# Patient Record
Sex: MALE | Race: ASIAN | NOT HISPANIC OR LATINO | URBAN - METROPOLITAN AREA
[De-identification: names, ages, dates, MRNs, and addresses within clinical notes are randomized per-mention and may not be internally consistent; named-entity substitution may affect disease eponyms.]

---

## 2017-08-02 ENCOUNTER — OFFICE VISIT (OUTPATIENT)
Dept: FAMILY MEDICINE | Facility: CLINIC | Age: 22
End: 2017-08-02

## 2017-08-02 VITALS
BODY MASS INDEX: 21.61 KG/M2 | SYSTOLIC BLOOD PRESSURE: 119 MMHG | WEIGHT: 126.6 LBS | HEIGHT: 64 IN | TEMPERATURE: 98.8 F | HEART RATE: 77 BPM | DIASTOLIC BLOOD PRESSURE: 76 MMHG

## 2017-08-02 DIAGNOSIS — Z23 NEED FOR VACCINATION: Primary | ICD-10-CM

## 2017-08-02 DIAGNOSIS — K92.1 BLOOD IN STOOL: ICD-10-CM

## 2017-08-02 LAB
% GRANULOCYTES: 65 %G (ref 40–75)
ALBUMIN SERPL BCP-MCNC: 4.5 G/DL (ref 3.5–5)
ALP SERPL-CCNC: 70 U/L (ref 45–120)
ALT SERPL W/O P-5'-P-CCNC: 18 U/L (ref 0–45)
ANION GAP SERPL CALCULATED.3IONS-SCNC: 10 MMOL/L (ref 5–18)
AST SERPL-CCNC: 23 U/L (ref 0–40)
BILIRUB SERPL-MCNC: 0.7 MG/DL (ref 0–1)
BUN SERPL-MCNC: 10 MG/DL (ref 8–22)
CALCIUM SERPL-MCNC: 10.1 MG/DL (ref 8.5–10.5)
CHLORIDE SERPL-SCNC: 101 MMOL/L (ref 98–107)
CO2 SERPL-SCNC: 27 MMOL/L (ref 22–31)
CREAT SERPL-MCNC: 0.86 MG/DL (ref 0.7–1.3)
GLUCOSE SERPL-MCNC: 78 MG/DL (ref 70–125)
GRANULOCYTES #: 4.1 K/UL (ref 1.6–8.3)
HCT VFR BLD AUTO: 53.3 % (ref 40–53)
HEMOGLOBIN: 16.3 G/DL (ref 13.3–17.7)
LYMPHOCYTES # BLD AUTO: 1.7 K/UL (ref 0.8–5.3)
LYMPHOCYTES NFR BLD AUTO: 27.4 %L (ref 20–48)
MCH RBC QN AUTO: 32 PG (ref 26.5–35)
MCHC RBC AUTO-ENTMCNC: 30.6 G/DL (ref 32–36)
MCV RBC AUTO: 104.5 FL (ref 78–100)
MID #: 0.5 K/UL (ref 0–2.2)
MID %: 7.6 %M (ref 0–20)
PLATELET # BLD AUTO: 330 K/UL (ref 150–450)
POTASSIUM SERPL-SCNC: 4.5 MMOL/L (ref 3.5–5)
PROT SERPL-MCNC: 8.1 G/DL (ref 6–8)
RBC # BLD AUTO: 5.1 M/UL (ref 4.4–5.9)
SODIUM SERPL-SCNC: 138 MMOL/L (ref 136–145)
WBC # BLD AUTO: 6.3 K/UL (ref 4–11)

## 2017-08-02 NOTE — PROGRESS NOTES
"[facility] Visit    Subjective:  Andrey Mccray is a 22 year old male with a PMHx significant for an unspecified liver disease who presents with bright red blood per rectum.     Starting 2 weeks ago, he began noticing small amounts of bright red blood in his stool and on toilet paper after wiping. He reports it comes and goes each day, but it always remains the same small amount. He denies any rectal or abdominal pain. His stools have been normal and soft. Denies constipation or hard stools. No diarrhea, nausea, vomiting, abdominal distension. No fever, chills, rash. No recent travel or sick contacts. He cannot recall any significant preceding event 2 weeks ago - no new or suspicious foods, no illness.     Of note, he describes having a significant liver disease in 2014 around the time he immigrated from [facility] [facility]. He recalls being jaundiced and ill, and getting imaging of his liver. He doesn't remember what disease he had or the treatment he received, but was told that he was completely cured. Hasn't had any issues since.     Immunizations are not UTD, planning to get Hep A, HPV #3 and Hep B #3 today.     ROS:   General: No fevers, chills, weight loss  Head: No headache  Neck: No swallowing problems   Resp: No cough, wheezing, congestion, coryza  GI: No constipation, diarrhea, nausea or vomiting  Skin: No rash    Objective:  Vitals:    08/02/17 1028   BP: 119/76   BP Location: Left arm   Patient Position: Sitting   Cuff Size: Adult Regular   Pulse: 77   Temp: 98.8  F (37.1  C)   TempSrc: Oral   Weight: 126 lb 9.6 oz (57.4 kg)   Height: 5' 3.5\" (161.3 cm)     Body mass index is 22.07 kg/(m^2).    Gen: NAD, appears well-hydrated  HEENT: PERRLA, no scleral icterus; no mouth lesions or ulcers, nasopharynx pink and moist, oropharynx pink and moist  Neck: supple without LAD, no masses  CV: RRR, no murmurs/rubs/gallops  Pulm: CTAB, no wheezes/rales/rhonchi, good air entry   ABD: soft, nontender, " nondistended, no hepatosplenomegaly, no masses, no rebound, +BS throughout  Rectal: no internal or external hemorrhoids, no anal fissures, no abnormalities noted on anoscopy; no actively bleeding sites or lesions; normal prostate on FRANTZ  Skin: no jaundice, no rash or lesions    Assessment/Plan:  Andrey was seen today for intermittent, minor rectal bleeding. He has no red flag signs or symptoms, nor any significant GI symptoms. No pain or other complaints. Normal physical exam and vitals stable. DDx includes hemorrhoids and anal fissure given BRBPR in an otherwise healthy young person, but Dr. Gamble and I could not visualize any abnormalities. Other ddx include lower GI bleed, UC/Crohn's, liver disease and malignancy, though less likely given his presentation. We will get a CBC and CMP to investigate any concerning labs, but I expect his minor bleeding to resolve spontaneously. Patient knows to follow up with clinic if symptoms worsen or persist in the next week.     Diagnoses and all orders for this visit:    Need for vaccination  -     ADMIN VACCINE, INITIAL  -     ADMIN VACCINE, EACH ADDITIONAL  -     HEPATITIS A VACCINE ADULT IM  -     HEPATITIS B VACCINE,ADULT,IM  -     HPV9 (Gardasil 9 )    Blood in stool  -     CBC with Diff Plt ([facility])  -     Comprehensive Metabolic Panel ([place])      Options for treatment and follow-up care were reviewed with the patient who was engaged and actively involved in the decision making process, verbalized understanding of the options discussed, and satisfied with the final plan.    Patient was staffed with supervising physician, Dr. Gamble.     Quique Linda MD, PGY-1  [facility]

## 2017-08-02 NOTE — PROGRESS NOTES
Preceptor attestation:  Patient seen and discussed with the resident. Assessment and plan reviewed with resident and agreed upon.  Supervising physician: Alok Gamble  [facility]

## 2017-08-02 NOTE — PATIENT INSTRUCTIONS
Continue to monitor closely if bleeding worsens or persists, call clinic immediately if concerned.     We will get blood tests today and call you with results. We may set up a future appointment depending on the results.

## 2017-08-03 NOTE — PROGRESS NOTES
Please call patient with normal lab results. He speaks Yanni. His blood tests returned normal, and he will not have to follow-up unless his symptoms worsen or persist in the next week. Thanks.

## 2017-11-29 ENCOUNTER — OFFICE VISIT - HEALTHEAST (OUTPATIENT)
Dept: ADDICTION MEDICINE | Facility: HOSPITAL | Age: 22
End: 2017-11-29

## 2017-11-29 DIAGNOSIS — F10.10 MILD ALCOHOL USE DISORDER: ICD-10-CM

## 2018-10-02 ENCOUNTER — OFFICE VISIT - HEALTHEAST (OUTPATIENT)
Dept: ADDICTION MEDICINE | Facility: CLINIC | Age: 23
End: 2018-10-02

## 2018-10-02 DIAGNOSIS — F12.10 CANNABIS USE DISORDER, MILD, ABUSE: ICD-10-CM

## 2018-10-02 DIAGNOSIS — F10.20 MODERATE ALCOHOL USE DISORDER (H): ICD-10-CM

## 2018-10-04 ENCOUNTER — AMBULATORY - HEALTHEAST (OUTPATIENT)
Dept: ADDICTION MEDICINE | Facility: CLINIC | Age: 23
End: 2018-10-04

## 2018-10-11 ENCOUNTER — OFFICE VISIT - HEALTHEAST (OUTPATIENT)
Dept: ADDICTION MEDICINE | Facility: HOSPITAL | Age: 23
End: 2018-10-11

## 2018-10-11 DIAGNOSIS — F10.20 MODERATE ALCOHOL USE DISORDER (H): ICD-10-CM

## 2018-10-22 ENCOUNTER — OFFICE VISIT - HEALTHEAST (OUTPATIENT)
Dept: ADDICTION MEDICINE | Facility: HOSPITAL | Age: 23
End: 2018-10-22

## 2018-10-22 DIAGNOSIS — F10.20 MODERATE ALCOHOL USE DISORDER (H): ICD-10-CM

## 2018-10-23 ENCOUNTER — OFFICE VISIT - HEALTHEAST (OUTPATIENT)
Dept: ADDICTION MEDICINE | Facility: HOSPITAL | Age: 23
End: 2018-10-23

## 2018-10-23 DIAGNOSIS — F10.20 ALCOHOL USE DISORDER, SEVERE, DEPENDENCE (H): ICD-10-CM

## 2018-10-25 ENCOUNTER — AMBULATORY - HEALTHEAST (OUTPATIENT)
Dept: ADDICTION MEDICINE | Facility: HOSPITAL | Age: 23
End: 2018-10-25

## 2018-10-25 ENCOUNTER — OFFICE VISIT - HEALTHEAST (OUTPATIENT)
Dept: ADDICTION MEDICINE | Facility: HOSPITAL | Age: 23
End: 2018-10-25

## 2018-10-25 DIAGNOSIS — F10.20 ALCOHOL USE DISORDER, SEVERE, DEPENDENCE (H): ICD-10-CM

## 2018-10-26 ENCOUNTER — AMBULATORY - HEALTHEAST (OUTPATIENT)
Dept: ADDICTION MEDICINE | Facility: HOSPITAL | Age: 23
End: 2018-10-26

## 2018-11-08 ENCOUNTER — OFFICE VISIT - HEALTHEAST (OUTPATIENT)
Dept: ADDICTION MEDICINE | Facility: HOSPITAL | Age: 23
End: 2018-11-08

## 2018-11-08 DIAGNOSIS — F10.20 MODERATE ALCOHOL USE DISORDER (H): ICD-10-CM

## 2018-11-09 ENCOUNTER — AMBULATORY - HEALTHEAST (OUTPATIENT)
Dept: ADDICTION MEDICINE | Facility: HOSPITAL | Age: 23
End: 2018-11-09

## 2018-11-13 ENCOUNTER — OFFICE VISIT - HEALTHEAST (OUTPATIENT)
Dept: ADDICTION MEDICINE | Facility: HOSPITAL | Age: 23
End: 2018-11-13

## 2018-11-13 DIAGNOSIS — F10.20 MODERATE ALCOHOL USE DISORDER (H): ICD-10-CM

## 2018-11-15 ENCOUNTER — OFFICE VISIT - HEALTHEAST (OUTPATIENT)
Dept: ADDICTION MEDICINE | Facility: HOSPITAL | Age: 23
End: 2018-11-15

## 2018-11-15 DIAGNOSIS — F10.20 MODERATE ALCOHOL USE DISORDER (H): ICD-10-CM

## 2018-11-19 ENCOUNTER — AMBULATORY - HEALTHEAST (OUTPATIENT)
Dept: ADDICTION MEDICINE | Facility: HOSPITAL | Age: 23
End: 2018-11-19

## 2018-11-20 ENCOUNTER — OFFICE VISIT - HEALTHEAST (OUTPATIENT)
Dept: ADDICTION MEDICINE | Facility: HOSPITAL | Age: 23
End: 2018-11-20

## 2018-11-20 DIAGNOSIS — F10.20 MODERATE ALCOHOL USE DISORDER (H): ICD-10-CM

## 2018-11-23 ENCOUNTER — AMBULATORY - HEALTHEAST (OUTPATIENT)
Dept: ADDICTION MEDICINE | Facility: HOSPITAL | Age: 23
End: 2018-11-23

## 2018-11-27 ENCOUNTER — OFFICE VISIT - HEALTHEAST (OUTPATIENT)
Dept: ADDICTION MEDICINE | Facility: HOSPITAL | Age: 23
End: 2018-11-27

## 2018-11-27 DIAGNOSIS — F10.20 MODERATE ALCOHOL USE DISORDER (H): ICD-10-CM

## 2018-11-29 ENCOUNTER — AMBULATORY - HEALTHEAST (OUTPATIENT)
Dept: ADDICTION MEDICINE | Facility: HOSPITAL | Age: 23
End: 2018-11-29

## 2018-11-29 ENCOUNTER — OFFICE VISIT - HEALTHEAST (OUTPATIENT)
Dept: ADDICTION MEDICINE | Facility: HOSPITAL | Age: 23
End: 2018-11-29

## 2018-11-29 DIAGNOSIS — F10.20 ALCOHOL USE DISORDER, MODERATE, DEPENDENCE (H): ICD-10-CM

## 2018-12-04 ENCOUNTER — OFFICE VISIT - HEALTHEAST (OUTPATIENT)
Dept: ADDICTION MEDICINE | Facility: HOSPITAL | Age: 23
End: 2018-12-04

## 2018-12-04 DIAGNOSIS — F10.20 ALCOHOL USE DISORDER, MODERATE, DEPENDENCE (H): ICD-10-CM

## 2018-12-06 ENCOUNTER — OFFICE VISIT - HEALTHEAST (OUTPATIENT)
Dept: ADDICTION MEDICINE | Facility: HOSPITAL | Age: 23
End: 2018-12-06

## 2018-12-06 DIAGNOSIS — F10.20 ALCOHOL USE DISORDER, MODERATE, DEPENDENCE (H): ICD-10-CM

## 2018-12-07 ENCOUNTER — AMBULATORY - HEALTHEAST (OUTPATIENT)
Dept: ADDICTION MEDICINE | Facility: HOSPITAL | Age: 23
End: 2018-12-07

## 2018-12-11 ENCOUNTER — OFFICE VISIT - HEALTHEAST (OUTPATIENT)
Dept: ADDICTION MEDICINE | Facility: HOSPITAL | Age: 23
End: 2018-12-11

## 2018-12-11 DIAGNOSIS — F10.20 ALCOHOL USE DISORDER, MODERATE, DEPENDENCE (H): ICD-10-CM

## 2018-12-13 ENCOUNTER — OFFICE VISIT - HEALTHEAST (OUTPATIENT)
Dept: ADDICTION MEDICINE | Facility: HOSPITAL | Age: 23
End: 2018-12-13

## 2018-12-13 DIAGNOSIS — F10.20 ALCOHOL USE DISORDER, MODERATE, DEPENDENCE (H): ICD-10-CM

## 2018-12-14 ENCOUNTER — AMBULATORY - HEALTHEAST (OUTPATIENT)
Dept: ADDICTION MEDICINE | Facility: HOSPITAL | Age: 23
End: 2018-12-14

## 2018-12-18 ENCOUNTER — OFFICE VISIT - HEALTHEAST (OUTPATIENT)
Dept: ADDICTION MEDICINE | Facility: HOSPITAL | Age: 23
End: 2018-12-18

## 2018-12-18 DIAGNOSIS — F10.20 ALCOHOL USE DISORDER, MODERATE, DEPENDENCE (H): ICD-10-CM

## 2018-12-20 ENCOUNTER — OFFICE VISIT - HEALTHEAST (OUTPATIENT)
Dept: ADDICTION MEDICINE | Facility: HOSPITAL | Age: 23
End: 2018-12-20

## 2018-12-20 DIAGNOSIS — F10.20 ALCOHOL USE DISORDER, MODERATE, DEPENDENCE (H): ICD-10-CM

## 2018-12-21 ENCOUNTER — AMBULATORY - HEALTHEAST (OUTPATIENT)
Dept: ADDICTION MEDICINE | Facility: HOSPITAL | Age: 23
End: 2018-12-21

## 2018-12-27 ENCOUNTER — OFFICE VISIT - HEALTHEAST (OUTPATIENT)
Dept: ADDICTION MEDICINE | Facility: HOSPITAL | Age: 23
End: 2018-12-27

## 2018-12-27 ENCOUNTER — AMBULATORY - HEALTHEAST (OUTPATIENT)
Dept: ADDICTION MEDICINE | Facility: HOSPITAL | Age: 23
End: 2018-12-27

## 2018-12-27 DIAGNOSIS — F10.20 ALCOHOL USE DISORDER, MODERATE, DEPENDENCE (H): ICD-10-CM

## 2019-01-03 ENCOUNTER — OFFICE VISIT - HEALTHEAST (OUTPATIENT)
Dept: ADDICTION MEDICINE | Facility: HOSPITAL | Age: 24
End: 2019-01-03

## 2019-01-03 DIAGNOSIS — F10.20 ALCOHOL USE DISORDER, MODERATE, DEPENDENCE (H): ICD-10-CM

## 2019-01-10 ENCOUNTER — OFFICE VISIT - HEALTHEAST (OUTPATIENT)
Dept: ADDICTION MEDICINE | Facility: HOSPITAL | Age: 24
End: 2019-01-10

## 2019-01-10 DIAGNOSIS — F10.20 ALCOHOL USE DISORDER, MODERATE, DEPENDENCE (H): ICD-10-CM

## 2019-01-11 ENCOUNTER — AMBULATORY - HEALTHEAST (OUTPATIENT)
Dept: ADDICTION MEDICINE | Facility: HOSPITAL | Age: 24
End: 2019-01-11

## 2019-01-15 ENCOUNTER — OFFICE VISIT - HEALTHEAST (OUTPATIENT)
Dept: ADDICTION MEDICINE | Facility: HOSPITAL | Age: 24
End: 2019-01-15

## 2019-01-15 DIAGNOSIS — F10.20 ALCOHOL USE DISORDER, MODERATE, DEPENDENCE (H): ICD-10-CM

## 2019-01-17 ENCOUNTER — OFFICE VISIT - HEALTHEAST (OUTPATIENT)
Dept: ADDICTION MEDICINE | Facility: HOSPITAL | Age: 24
End: 2019-01-17

## 2019-01-17 DIAGNOSIS — F10.20 ALCOHOL USE DISORDER, MODERATE, DEPENDENCE (H): ICD-10-CM

## 2019-01-18 ENCOUNTER — AMBULATORY - HEALTHEAST (OUTPATIENT)
Dept: ADDICTION MEDICINE | Facility: HOSPITAL | Age: 24
End: 2019-01-18

## 2019-01-30 ENCOUNTER — OFFICE VISIT - HEALTHEAST (OUTPATIENT)
Dept: ADDICTION MEDICINE | Facility: HOSPITAL | Age: 24
End: 2019-01-30

## 2019-01-30 DIAGNOSIS — F10.20 ALCOHOL USE DISORDER, MODERATE, DEPENDENCE (H): ICD-10-CM

## 2019-02-01 ENCOUNTER — AMBULATORY - HEALTHEAST (OUTPATIENT)
Dept: ADDICTION MEDICINE | Facility: HOSPITAL | Age: 24
End: 2019-02-01

## 2019-02-07 ENCOUNTER — OFFICE VISIT (OUTPATIENT)
Dept: FAMILY MEDICINE | Facility: CLINIC | Age: 24
End: 2019-02-07
Payer: COMMERCIAL

## 2019-02-07 VITALS
RESPIRATION RATE: 24 BRPM | WEIGHT: 122.6 LBS | DIASTOLIC BLOOD PRESSURE: 90 MMHG | TEMPERATURE: 97.8 F | HEIGHT: 65 IN | HEART RATE: 79 BPM | SYSTOLIC BLOOD PRESSURE: 130 MMHG | OXYGEN SATURATION: 96 % | BODY MASS INDEX: 20.43 KG/M2

## 2019-02-07 DIAGNOSIS — R10.13 EPIGASTRIC PAIN: Primary | ICD-10-CM

## 2019-02-07 DIAGNOSIS — B18.1 CHRONIC VIRAL HEPATITIS B WITHOUT DELTA AGENT AND WITHOUT COMA (H): ICD-10-CM

## 2019-02-07 DIAGNOSIS — Z23 NEED FOR IMMUNIZATION AGAINST INFLUENZA: ICD-10-CM

## 2019-02-07 LAB
ALBUMIN SERPL-MCNC: 5.1 MG/DL (ref 3.9–5.1)
ALP SERPL-CCNC: 81.9 U/L (ref 40–150)
ALT SERPL-CCNC: 60.4 U/L (ref 0–45)
AST SERPL-CCNC: 76 U/L (ref 0–55)
BILIRUB SERPL-MCNC: 1.4 MG/DL (ref 0.2–1.3)
BILIRUBIN DIRECT: 0.6 MG/DL (ref 0–0.2)
PROT SERPL-MCNC: 8.4 G/DL (ref 6.8–8.8)

## 2019-02-07 ASSESSMENT — PATIENT HEALTH QUESTIONNAIRE - PHQ9: SUM OF ALL RESPONSES TO PHQ QUESTIONS 1-9: 0

## 2019-02-07 ASSESSMENT — PAIN SCALES - GENERAL: PAINLEVEL: MILD PAIN (2)

## 2019-02-07 ASSESSMENT — MIFFLIN-ST. JEOR: SCORE: 1473.6

## 2019-02-07 NOTE — LETTER
[address] [address]  Phone: [phone]  Fax: [phone]    02/07/19    Andrey Mccray  [address]  [place] [address]      To whom it may concern:     Please excuse Andrey Mccray from work on 2/8/2019 for medical reasons. If any questions or concerns feel free to call the number above.    Sincerely,      Misty Augustin MD

## 2019-02-07 NOTE — PROGRESS NOTES
"       MARY KAY Mccray is a 24 year old  male with a PMH significant for:     Patient Active Problem List   Diagnosis     Blood in stool     Chronic hepatitis B virus infection (H)     He presents with abdominal pain for 2 months. He reports burning pain in the epigastrium when he eats fatty or spicy foods. States that he begins to feel nauseous and will often vomit with these episodes. He feel better after vomiting. No problems with other foods. He does not think he has lost weight, clothes fit the same. No fever/chills/sweats. No diarrhea or constipation. No blood in the stool. No black or tarry stools. Patient does not think he has every been tested for helicobacter pylori.     Patient used to drink, but stopped after he got a DWI last year. Does not take NSAIDs.     PMH, Medications and Allergies were reviewed and updated as needed.        REVIEW OF SYSTEMS     See HPI        OBJECTIVE     Vitals:    02/07/19 1003   BP: 130/90   Pulse: 79   Resp: 24   Temp: 97.8  F (36.6  C)   TempSrc: Oral   SpO2: 96%   Weight: 55.6 kg (122 lb 9.6 oz)   Height: 1.66 m (5' 5.35\")     Body mass index is 20.18 kg/m .    Gen: Thin adult male. Alert, oriented and appropriate. NAD  HEENT: MMM  CV: RRR, no rubs, murmurs or extra heart sounds  Pulm: CTAB, no wheezes, rales or rhonchi  Abdomen: Soft, non-tender, non-distended. No masses or hepatosplenomegaly. Normoactive bowel sounds    No results found for this or any previous visit (from the past 24 hour(s)).        ASSESSMENT AND PLAN     1. Epigastric pain  Suspect dyspepsia vs gastritis vs PUD. Biliary colic and pancreatitis also on the differential. Pt is vitally stable, denies weight loss or other more sinister symptoms. Patient is originally from  [place] and is high risk for helicobacter pylori. We will obtain H pylori stool test today. Following collection of stool, patient can start ranitidine for trial of therapy. Follow-up in 1 month.   - [facility] " [facility] Future  - ranitidine (ZANTAC) 150 MG capsule; Take 1 capsule (150 mg) by mouth 2 times daily  Dispense: 180 capsule; Refill: 3    2. Chronic viral hepatitis B without delta agent and without coma (H)  Hx of this. Testing in 2013 shows positive Hep B surface agn and core saritha, negative surface saritha, normal LFTs. He did have a US at this time, though I cannot see the results. He believes this was normal. He is due for repeat testing today. Plan to schedule liver US at a future visit.   - Hepatic Panel ([place])  - Hepatitis Be Agn ([employer] [facility])  - Hepatitis B DNA Detect and Quant [facility])    3. Need for immunization against influenza  - ADMIN VACCINE, INITIAL  - FLU VAC QUADRIVLENT SPLIT VIRUS IM 0.5ml dosage  - INJECTION INTRAMUSCULAR OR SUB-Q          RTC in 1 month for follow up of epigastric pain or sooner if develops new or worsening symptoms.    Misty Augustin    I precepted today with Hussein Blackmon MD.

## 2019-02-07 NOTE — LETTER
March 1, 2019      Andrey Lae  [address]  [place] [address]        Dear Andrey,    Please see below for your test results.    Your recent lab results show elevations in viral counts indicating that the hepatitis B virus is active in your body. Your liver function tests also show mild elevations suggesting that the virus is affecting your liver function. I would like you to return to clinic to discuss these results further and to schedule an ultrasound picture of your liver. Please feel free to call the clinic with questions or concerns.     Resulted Orders   Hepatic Panel ([place])   Result Value Ref Range    Albumin 5.1 3.9 - 5.1 mg/dL    Alkaline Phosphatase 81.9 40.0 - 150.0 U/L    ALT 60.4 (H) 0.0 - 45.0 U/L    AST 76.0 (H) 0.0 - 55.0 U/L    Bilirubin Direct 0.6 (H) 0.0 - 0.2 mg/dL    Bilirubin Total 1.4 (H) 0.2 - 1.3 mg/dL    Protein Total 8.4 6.8 - 8.8 g/dL   Hepatitis Be Agn ([facility])   Result Value Ref Range    Hepatitis Be Agn Negative Negative      Comment:      Performed by [facility],  [address] [place] [address] [id number] [phone]  [email or web address], Júnior Correa MD, Lab. Director      Narrative    Test performed by:  [facility]  [address] [place] [id number]  Reported to [facility], per [place] statute.    Hepatitis B DNA Detect and Quant ([facility])   Result Value Ref Range    Hep B Virus DNA Quant IU/mL 35632 (A) HBVND [IU]/mL      Comment:      The CINDY AmpliPrep/CINDY TaqMan HBV Test is an FDA-approved in vitro nucleic  acid amplification test for the quantitation of HBV DNA in human plasma (EDTA  plasma) or serum using the CINDY AmpliPrep Instrument for automated viral  nucleic acid extraction and the CINDY TaqMan Analyzer or CINDY TaqMan for the  automated Real Time PCR amplification and detection of viral nucleic acid  target.  Titer results are reported in International Units/mL (IU/mL) using the 1st WHO  International standard for HBV for Nucleic Acid Amplification  based assays.      Hep B Virus DNA Quant Log IU/mL 4.4 (H) <1.3 [Log_IU]/mL      Comment:      PERFORMED AT  [facility]  [address] [place] [address]      Narrative    Test performed by:  [facility]  [address], [place] [address]       If you have any questions, please call the clinic to make an appointment.    Sincerely,    Misty Augustin MD

## 2019-02-07 NOTE — PROGRESS NOTES
Preceptor Attestation:   Patient seen, evaluated and discussed with the resident. I have verified the content of the note, which accurately reflects my assessment of the patient and the plan of care.   Supervising Physician:  Hussein Blackmon MD

## 2019-02-07 NOTE — NURSING NOTE
Chief Complaint   Patient presents with     RECHECK     Abdominal Pain specially when eating fat food      Reid Alexander CMA    Due to patient being non-English speaking/uses sign language, an  was used for this visit. Only for face-to-face interpretation by an external agency, date and length of interpretation can be found on the scanned worksheet.     name: Feng Lainez  Agency: [employer]  Language: Yanni   Telephone number: [phone]  Type of interpretation: Face-to-face, spoken     Reid Alexander CMA

## 2019-02-07 NOTE — PATIENT INSTRUCTIONS
To do list:  1) Labs today  2) Stool test - bring back ASAP  3) Start ranitidine for pain twice daily after collecting stool.     I would like to see you back in 1 month

## 2019-02-08 DIAGNOSIS — R10.13 EPIGASTRIC PAIN: ICD-10-CM

## 2019-02-09 LAB — HEPATITIS BE AGN: NEGATIVE

## 2019-02-11 LAB
H PYLORI ANTIGEN: NORMAL
REPORT STATUS: NORMAL
SPECIMEN DESCRIPTION: NORMAL

## 2019-02-12 LAB
HEP B VIRUS DNA QUANT IU/ML: ABNORMAL [IU]/ML
HEP B VIRUS DNA QUANT LOG IU/ML: 4.4 {LOG_IU}/ML

## 2019-02-13 ENCOUNTER — AMBULATORY - HEALTHEAST (OUTPATIENT)
Dept: ADDICTION MEDICINE | Facility: HOSPITAL | Age: 24
End: 2019-02-13

## 2019-02-13 ENCOUNTER — OFFICE VISIT - HEALTHEAST (OUTPATIENT)
Dept: ADDICTION MEDICINE | Facility: HOSPITAL | Age: 24
End: 2019-02-13

## 2019-02-13 DIAGNOSIS — F10.20 ALCOHOL USE DISORDER, MODERATE, DEPENDENCE (H): ICD-10-CM

## 2019-02-14 ENCOUNTER — AMBULATORY - HEALTHEAST (OUTPATIENT)
Dept: ADDICTION MEDICINE | Facility: HOSPITAL | Age: 24
End: 2019-02-14

## 2019-02-20 ENCOUNTER — AMBULATORY - HEALTHEAST (OUTPATIENT)
Dept: ADDICTION MEDICINE | Facility: HOSPITAL | Age: 24
End: 2019-02-20

## 2019-02-20 ENCOUNTER — OFFICE VISIT - HEALTHEAST (OUTPATIENT)
Dept: ADDICTION MEDICINE | Facility: HOSPITAL | Age: 24
End: 2019-02-20

## 2019-02-20 DIAGNOSIS — F10.20 ALCOHOL USE DISORDER, MODERATE, DEPENDENCE (H): ICD-10-CM

## 2019-02-21 ENCOUNTER — AMBULATORY - HEALTHEAST (OUTPATIENT)
Dept: ADDICTION MEDICINE | Facility: HOSPITAL | Age: 24
End: 2019-02-21

## 2019-02-27 ENCOUNTER — AMBULATORY - HEALTHEAST (OUTPATIENT)
Dept: ADDICTION MEDICINE | Facility: HOSPITAL | Age: 24
End: 2019-02-27

## 2019-02-27 ENCOUNTER — OFFICE VISIT - HEALTHEAST (OUTPATIENT)
Dept: ADDICTION MEDICINE | Facility: HOSPITAL | Age: 24
End: 2019-02-27

## 2019-02-27 DIAGNOSIS — F10.20 ALCOHOL USE DISORDER, MODERATE, DEPENDENCE (H): ICD-10-CM

## 2019-02-27 NOTE — RESULT ENCOUNTER NOTE
Please have  call the patient with the following results:    Dear Andrey Mccray,     Your recent lab results show elevations in viral counts indicating that the hepatitis B virus is active in your body. Your liver function tests also show mild elevations suggesting that the virus is affecting your liver function. I would like you to return to clinic to discuss these results further and to schedule an ultrasound picture of your liver. Please feel free to call the clinic with questions or concerns.     Misty Augustin MD

## 2019-02-28 ENCOUNTER — AMBULATORY - HEALTHEAST (OUTPATIENT)
Dept: ADDICTION MEDICINE | Facility: HOSPITAL | Age: 24
End: 2019-02-28

## 2019-03-06 ENCOUNTER — OFFICE VISIT - HEALTHEAST (OUTPATIENT)
Dept: ADDICTION MEDICINE | Facility: HOSPITAL | Age: 24
End: 2019-03-06

## 2019-03-06 ENCOUNTER — AMBULATORY - HEALTHEAST (OUTPATIENT)
Dept: LAB | Facility: HOSPITAL | Age: 24
End: 2019-03-06

## 2019-03-06 DIAGNOSIS — F10.20 ALCOHOL USE DISORDER, MODERATE, DEPENDENCE (H): ICD-10-CM

## 2019-03-06 LAB
AMPHETAMINES UR QL SCN: ABNORMAL
BARBITURATES UR QL: ABNORMAL
BENZODIAZ UR QL: ABNORMAL
CANNABINOIDS UR QL SCN: ABNORMAL
COCAINE UR QL: ABNORMAL
CREAT UR-MCNC: 8.6 MG/DL
ETHANOL UR CFM-MCNC: <10 MG/DL
METHADONE UR QL SCN: ABNORMAL
OPIATES UR QL SCN: ABNORMAL
OXYCODONE UR QL: ABNORMAL
PCP UR QL SCN: ABNORMAL

## 2019-03-07 ENCOUNTER — AMBULATORY - HEALTHEAST (OUTPATIENT)
Dept: ADDICTION MEDICINE | Facility: HOSPITAL | Age: 24
End: 2019-03-07

## 2019-03-13 ENCOUNTER — OFFICE VISIT - HEALTHEAST (OUTPATIENT)
Dept: ADDICTION MEDICINE | Facility: HOSPITAL | Age: 24
End: 2019-03-13

## 2019-03-13 DIAGNOSIS — F10.20 ALCOHOL USE DISORDER, MODERATE, DEPENDENCE (H): ICD-10-CM

## 2019-03-15 ENCOUNTER — AMBULATORY - HEALTHEAST (OUTPATIENT)
Dept: ADDICTION MEDICINE | Facility: HOSPITAL | Age: 24
End: 2019-03-15

## 2019-03-27 ENCOUNTER — OFFICE VISIT - HEALTHEAST (OUTPATIENT)
Dept: ADDICTION MEDICINE | Facility: HOSPITAL | Age: 24
End: 2019-03-27

## 2019-03-27 DIAGNOSIS — F10.20 ALCOHOL USE DISORDER, MODERATE, DEPENDENCE (H): ICD-10-CM

## 2019-03-28 ENCOUNTER — AMBULATORY - HEALTHEAST (OUTPATIENT)
Dept: ADDICTION MEDICINE | Facility: HOSPITAL | Age: 24
End: 2019-03-28

## 2019-04-03 ENCOUNTER — OFFICE VISIT - HEALTHEAST (OUTPATIENT)
Dept: ADDICTION MEDICINE | Facility: HOSPITAL | Age: 24
End: 2019-04-03

## 2019-04-03 DIAGNOSIS — F10.20 ALCOHOL USE DISORDER, MODERATE, DEPENDENCE (H): ICD-10-CM

## 2019-04-05 ENCOUNTER — AMBULATORY - HEALTHEAST (OUTPATIENT)
Dept: ADDICTION MEDICINE | Facility: HOSPITAL | Age: 24
End: 2019-04-05

## 2021-05-08 NOTE — MR AVS SNAPSHOT
After Visit Summary   2017    Andrey Mccray    MRN: [id number]           Patient Information     Date Of Birth          1995        Visit Information        Provider Department      2017 10:40 AM Quique Linda MD [facility]        Today's Diagnoses     Need for vaccination    -  1    Blood in stool          Care Instructions    Continue to monitor closely if bleeding worsens or persists, call clinic immediately if concerned.     We will get blood tests today and call you with results. We may set up a future appointment depending on the results.           Follow-ups after your visit        Follow-up notes from your care team     Return in about 2 weeks (around 2017), or if symptoms worsen or fail to improve, for Minor bloody stools.      Who to contact     Please call your clinic at [phone] to:    Ask questions about your health    Make or cancel appointments    Discuss your medicines    Learn about your test results    Speak to your doctor   If you have compliments or concerns about an experience at your clinic, or if you wish to file a complaint, please contact [facility] Patient Relations at [phone] or email us at [email or web address]         Additional Information About Your Visit        MyChart Information     [employer] is an electronic gateway that provides easy, online access to your medical records. With [employer], you can request a clinic appointment, read your test results, renew a prescription or communicate with your care team.     To sign up for [employer] visit the website at [email or web address]   You will be asked to enter the access code listed below, as well as some personal information. Please follow the directions to create your username and password.     Your access code is: [email or web address]  Expires: 10/31/2017 11:31 AM     Your access code will  in 90 days. If you need help or a new code, please contact your  "[facility] or call [phone] for assistance.        Care EveryWhere ID     This is your Care EveryWhere ID. This could be used by other organizations to access your [place] medical records  [id number]        Your Vitals Were     Pulse Temperature Height BMI (Body Mass Index)          77 98.8  F (37.1  C) (Oral) 5' 3.5\" (161.3 cm) 22.07 kg/m2         Blood Pressure from Last 3 Encounters:   08/02/17 119/76    Weight from Last 3 Encounters:   08/02/17 126 lb 9.6 oz (57.4 kg)              We Performed the Following     ADMIN VACCINE, EACH ADDITIONAL     ADMIN VACCINE, INITIAL     CBC with Diff Plt ([facility])     Comprehensive Metabolic Panel ([place])     HEPATITIS A VACCINE ADULT IM     HEPATITIS B VACCINE,ADULT,IM     HPV9 (Gardasil 9 )        Primary Care Provider Office Phone #    Quique Linda [phone]       [facility] [address] [place] [address]        Equal Access to Services     ROMEO REYES AH: Hadii luis ku hadasho Soomaali, waaxda luqadaha, qaybta kaalmada adeegyada, nsetor delaneyin tiana franco . So [facility] [phone].    ATENCIÓN: Si habla español, tiene a bowers disposición servicios gratuitos de asistencia lingüística. Llame al [phone].    We comply with applicable federal civil rights laws and [place] laws. We do not discriminate on the basis of race, color, national origin, age, disability sex, sexual orientation or gender identity.            Thank you!     Thank you for choosing [facility]  for your care. Our goal is always to provide you with excellent care. Hearing back from our patients is one way we can continue to improve our services. Please take a few minutes to complete the written survey that you may receive in the mail after your visit with us. Thank you!             Your Updated Medication List - Protect others around you: Learn how to safely use, store and throw away your medicines at www.disposemymeds.org.    "   Notice  As of 8/2/2017 11:39 AM    You have not been prescribed any medications.       No

## 2021-05-27 NOTE — PROGRESS NOTES
Andrey Shazia attended 2 hours of group therapy today.    Total group size of 7.    3/27/2019 2:59 PM Tripp Perales

## 2021-05-27 NOTE — PROGRESS NOTES
[facility] SUBSTANCE USE DISORDER  DISCHARGE SUMMARY            Name:  Shanell Mccray   :  SARANYA Lee   Admit Date: 10/11/18   Discharge Date: 19   :  1995   Hours Completed: 78   Initial Diagnosis:  Alcohol Use Disorder, Moderate (F10.20) (303.90) Final Diagnosis:  Alcohol Use Disorder, Moderate (F10.20) (303.90)   Discharge Address:    [address] [place] [address]   Funding Source:    BLUE PLUS/MA     Discharge Type:  With Staff Approval (WSA)    Client was receiving residential services at the time of discharge:   No    Reasons for and circumstances of service termination:  SHANELL MCCRAY is a 24 y.o. year-old male who admitted to [facility] on 10/11/18 and has completed 78 hours as of 2019. Client has successfully completed IOP with staff approval.  Client received Basic Education on group norms and treatment orientation, and information on the following topics; Group Rules and Norms, Healthy Life [place], What is Stress?, Stress Management, Setting a Personal Goal, What is Addiction?, Describe Your Use Behavior, Benefits and Consequences of Drinking and Using, Understanding Cravings and Withdrawal, What is Recovery, The Power of Positive Thinking, Thinking Comes Before Drinking, Doctor's Presentation - Physical Effects of Addiction, How to Say No, Effects of Alcohol and Drugs on the Body, Recognizing and Handling Stress, Anger Management, Levels of Treatment, Stress Management, Having Lynn in Yourself, Goals and Recovery, DWI Laws and Penalties, Recovery Resources, Healthy Family Communication and Rebuilding Healthy Families.     If program discharge status was At Staff Request, the license dash must identify the following:    Other interested parties conferred with: N/A  Referrals provided: N/A  Alternatives considered and attempted before deciding to discharge: N/A      Dimension/Course of Treatment/Individualized Care:   1.  Withdrawal  Potential - Intake Risk level -  0 Discharge Risk level - 0  Narrative supporting risk description:  On admission, the client reported his last date of use as 7/24/2018. No signs of intoxication or withdrawal were noted or reported.  Treatment plan goals and progress towards those goals:  Goal: Maintain abstinence.  The Client reported his last date of use of alcohol as 7/24/2018 and appeared to maintain abstinence throughout treatment.   2. Biomedical Conditions and Complications - Intake Risk level - 0 Discharge Risk level - 0  Narrative supporting risk description:  The client reported he had no medical needs unmet and was able to obtain any necessary care on his own.   Treatment plan goals and progress towards those goals:  Goal: Patient to maintain stable health throughout outpatient treatment.   No Changes noted during treatment.    3.  Emotional/Behavioral/Cognitive Conditions and Complications - Intake Risk level -  0 Discharge Risk level - 0  Narrative supporting risk description:  Client arrived to the  as a refugee in 2003 and is currently a green card dash but does not speak English and is still in the process of acculturating. No emotional or mental health problems noted or reported.   Treatment plan goals and progress towards those goals:  Goal: Maintain Stability of Mental Health.  The Client was an active and engaged program participant during IOP, and did not report or demonstrate any symptoms consistent with any mental health diagnosis.   4.  Readiness for Change - Intake Risk level -  1 Discharge Risk level - 0  Narrative supporting risk description:  On admission, Client had external motivation for treatment in the form of probation for DWI and Domestic Assault in [facility]. The Client appeared to lack insight into his substance use problems due in part to cultural barriers that interfere with his understanding of the need for treatment, but did express willingness to attend  treatment.  Treatment plan goals and progress towards those goals:  Goal: Patient to increase motivation towards recovery by participating in outpatient programming.   Client was openly vocal in groups actively participating without prompting. Client offered and accepted feedback to and from program peers. Client reported frequent outside support group attendance, in spite of limited resources for Yanni speaking individuals.   5.  Relapse/Continued Use/Continued Problem Potential - Intake Risk level -  2 Discharge Risk level - 1  Narrative supporting risk description:  On admission client demonstrated limited awareness of situations or emotions that trigger his drinking and did not display any knowledge or understanding of the skills or coping mechanisms necessary to avoid those triggers. The client struggled with the western concept of substance use disorders and cultural barriers existed to understanding the risk of relapse.  Treatment plan goals and progress towards those goals:  Goal: Client will gain understanding and insight into situations and emotions that trigger his substance use.  Client was an active and engaged program participant in IOP and participated in group discussion of relapse triggers and coping skills. Client attended and participated in IOP groups, and willingly accepted both peer and counselor feedback which he then appeared to integrate into his daily routine(s).   6.  Recovery Environment - Intake Risk level -  3 Discharge Risk level - 1  Narrative supporting risk description:  On admission, the client was not employed, lived with his mother and siblings and was not involved in any structured educational or volunteer activities. Client was not attending any community based recovery support groups and did not identify any reliable sources of recovery support. Client was on probation for DWI and domestic Assault.  Treatment plan goals and progress towards those goals:  Goal: Client will  explore and identify healthy sober supports in his community.  Client became familiar with opportunities for fellowship in Recovery by attending [facility] Support groups and participating in group discussions on expanding recovery support. Client remains on probation in [facility] but successful completion of treatment meets requirements of his probation.   Strengths: Client identified strengths included singing and playing musical instruments.  Needs: ESL Classes; Ongoing Recovery Support  Services Provided: Intake; assessment; treatment planning; Group Therapy; Psychoeducation; Service Coordination; lectures; 1x1 therapy; and recommendations at discharge.     Program Involvement:  Good  Attendance:   Good  Ability to relate in group/   Other program activities: Good  Assignment Completion: Good  Overall Behavior:  Excellent  Reported Family/Significant   Other Involvement:  N/A    Prognosis:   Good    Recommendations                                                                                                        Continue to Attend [facility] Support Group, Identify and Maintain a Sober Social Network of Friends, continue to follow requirements of probation.     Mental Health Referral  None   Physical Health Referral:  Primary Care Physician     Counselor Name and Title:  SARANYA Lee      Date:  4/5/2019  Time:  2:00 PM

## 2021-05-27 NOTE — PROGRESS NOTES
Andrey Shazia attended 2 hours of group therapy today.    Total group size of 7.    4/3/2019 3:05 PM Tripp Perales

## 2021-05-27 NOTE — PROGRESS NOTES
Weekly Progress Note  Andrey Mccray  1995  [id number]    D) Pt attended 1 group this week with 0 absences. Patient attended 0 individual sessions this week. A) Staff facilitated groups and reviewed tx progress. Assessed for [facility]. R) No VAP needed at this time.   Any significant events, defines as events that impact patients relationship with others inside and outside of treatment No  Indicate any changes or monitoring of physical or mental health problems No    Indicate involvement by any outside supports No  IAPP reviewed and modified as needed. NA  Pt working on the following dimensions:  Dimension #1 - Withdrawal Potential - Risk 0. Client reports last date of use of alcohol as 10/02/18, and last date of use of cannabis was 12/11/18. No signs of intoxication or withdrawal noted or reported..  Specific goals from treatment plan addressed this week:  Patient to maintain abstinence throughout outpatient treatment.   Effectiveness of strategies:  Progressing: No signs of intoxication or withdrawal noted or reported.     Dimension #2 - Biomedical - Risk 0. The client reports he has no medical needs unmet and is able to obtain any necessary care on his own.   Specific goals from treatment plan addressed this week:  Patient to maintain stable health throughout outpatient treatment.   Effectiveness of strategies:  Progressing: Patient was reminded to make an appointment for a medical screening at his earliest opportunity.    Dimension #3 - Emotional/Behavioral/Cognitive - Risk 0. No emotional or mental health problems noted or reported. .  Specific goals from treatment plan addressed this week:  N/A   Effectiveness of strategies:  N/A.      Dimension #4 - Treatment Acceptance/Resistance - Risk 1. The Client appears to lack insight into his substance use problems due in part to cultural barriers that interfere with his understanding of the need for treatment, but did express willingness to attend treatment.  Specific goals  "from treatment plan addressed this week:  Patient to increase motivation towards recovery by participating in outpatient programming.   Effectiveness of strategies:  Progressing: Client attended group this week and participated in discussions without prompting. 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    Dimension #5 - Relapse Potential - Risk 3. The Client appears to lack insight into his drinking problems due in part to cultural barriers that may interfere with his ability to gain and utilize any new information necessary to establish long term recovery.  Specific goals from treatment plan addressed this week:  The Client will demonstrate awareness and understanding of relapse risks and triggers.  Effectiveness of strategies:  Progressing: Client stated during check-in that he had no use and no thoughts or cravings for use this week.     Dimension #6 - Recovery Environment - Risk 3. Client is not currently attending any community based recovery support groups and did not identify any reliable sources of recovery support.  Specific goals from treatment plan addressed this week:  Client will explore and identify healthy sober supports in his community.  Effectiveness of strategies:  Progressing: Client stated he did not attend the [facility] Group this week due to working. Client stated he is required to provide breath samples 4 times per day, and has been losing sleep because of it.    T) Treatment plan updated no.  Patient notified and in agreement NA.  Patient educated on \"[facility] .  Patient has completed 76 of 110 program hours at this time. Projected Discharge date is 04/03/19. Current discharge plan is under development..     SARANYA Lee  3/28/2019, 9:43 AM  "

## 2021-06-14 NOTE — PROGRESS NOTES
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 from collateral contacts supported/largely agreed with information from the client and associated risk ratings.   []   Information from collateral contacts was significantly different from information from the client and lead to different risk ratings.    Summarize new information here: ---    Rule 25 Assessment Summary and Plan   's Recommendation  No Treatment Recommended at this Time as client likely would not benefit from the conventional treatment environment filtered through an .  Client could conceivably benefit from attending Driving With Care education if the curriculum could be culturally adapted and presented in a language he is fluent in.     Collateral Contacts     Please duplicate this page for each contact.  If this includes information which is sensitive and not public, separate this page from the rest of the assessment before sharing.  Retain the page in the assessment file.   Name  Екатерина Reardon Relationship  Mother Phone Number  [phone] Releases  yes       Information Provided:    Verified information provided by the client during the assessment interview. Stated: \"I did not notice any problems with his drinking before this. He did tell us that he would drink with friends, " "but I never saw him drink. I have never seen him drink at home, and I have not seen him drink since he was arrested.\"    Collateral Contacts     Name  Belkis Berumen   Relationship   Phone Number  [phone] Releases  yes       Information Provided:    Verified information provided by the client during his assessment interview.. Stated: \"I am representing the client in his DWI. This is his only arrest.\"    Staff Name and Title:  Tripp Perales [facility]    Date:  11/29/2017  Time:  8:57 AM    "

## 2021-06-20 NOTE — PROGRESS NOTES
Addiction Services - Initial Services Plan     Patient  Name: Andrey Mccray  MRN: [id number]   : 1995  Admit Date: 10/11/2018      Client describes their immediate need:   To learn recovery skills to prevent relapse. Comply with Probation  Are there any immediate Safety Needs such as (physical, stability, mobility): Pt is able to get medical care as needed. Pt denies immediate concerns.   Immediate Health Needs and Plan:   Remain abstinent from substance use and attend first group therapy session on 10/22/2018  Vulnerable Adult: No   Issues to be addressed in the first sessions: Treatment planning, orientation to group norms and rules, and welcomed by peers.     Patient strengths and needs:   Strengths identified as Being a hard worker, and a sweet talker.  Needs identified as ESL classes, Service Coordination, comply with probation, monitor insurance.    Plan for patient for time between intake and completion of the treatment plan:   Attend all group therapy sessions as directed, complete all written and oral assignments as directed, and remain clean and sober. A relapse, if any, must be reported to staff immediately in order to ensure you are receiving the proper level of care. If you cannot attend a group therapy session you must call contact information provided in intake folder and leave a message before or during group hours.       Vulnerable Adult Review   [X] Review of the Facility Abuse Prevention plan was reviewed with the patient   [X] No Individual Abuse Plan is necessary   [ ] In addition to the Facility Abuse Prevention plan, an Individual Abuse Plan will be put in place       Staff Name/Title: SARANYA Lee   Date: 10/11/2018  Time: 10:48 AM

## 2021-06-20 NOTE — PROGRESS NOTES
"Intake Note:     D) Andrey Mccray is a 23 y.o.  single  male who is referred to [facility] via [employer] with funding from [employer]. Patient orientated x 3. Patient meets criteria for Alcohol Use Disorder, Moderate (F10.20) (303.90).  Patient appears appropriate for OUTPATIENT TREATMENT.   A) Met with patient for 60 minutes.  Completed intake assessment and preliminary paperwork. Patient was given and explained counselor & supervisor license number and contact info, Patient Bill of Rights, program rules/regulations, Program Abuse Prevention Plan, confidentiality & HIPPA policies, grievance procedure, presented ROIs, TB & HIV/AIDS policies & resources, and Vulnerable Adult policy.   Conducted Vulnerable Adult Assessment.   R)No special Vulnerable Adult needed at this time.  Patient signed and agreed to counselor & supervisor license number and contact info., Patient Bill of Rights, group rules/regulations, Program Abuse Prevention Plan, confidentiality & HIPPA policies, grievance procedure,  ROIs, TB & HIV/AIDS policies & resources, and Vulnerable Adult policy. Patient scored NO RISK on C-SSRS screen. Patient denied suicidal ideation/intent/plan/means at this time.     Opioid Use Disorder: No   Provided \"Options for Opioid Treatment in [place] and Overdose Prevention\" No     Dimension #1 - Withdrawal Potential - Risk 0. Client reports last date of use as 10/02/18. No signs of intoxication or withdrawal noted or reported.     Dimension #2 - Biomedical - Risk 0. The client reports he has no medical needs unmet and is able to obtain any necessary care on his own.     Dimension #3 - Emotional , Behavioral and Cognitive - Risk 0. No emotional or mental health problems noted or reported.      Dimension #4 - Readiness for Change - Risk 1. Client does not recognize that his alcohol use could be causing problems for him but states he is willing to attend treatment and follow recommendations. The " Client appears to lack insight into his substance use problems due in part to cultural barriers that interfere with his understanding of the need for treatment, but did express willingness to attend treatment.    Dimension #5 - Relapse Potential - Risk 3. The client did not demonstrate any awareness of situations or emotions that trigger his drinking or display any knowledge or understanding of the skills or coping mechanisms necessary to avoid those triggers. The Client appears to lack insight into his drinking problems due in part to cultural barriers that may interfere with his ability to gain and utilize any new information necessary to establish long term recovery.    Dimension #6 - Recovery Environment - Risk 3. Client reports he is employed full time and reports he is living with his parents, who are supportive. Client is not currently attending any community based recovery support groups and did not identify any reliable means of recovery support. Client is on probation for his 2nd DWI.    T) Explained counselor & supervisor license number and contact info, Patient Bill of Rights, program rules/regulations, Program Abuse Prevention Plan, confidentiality & HIPPA policies, grievance procedure, presented ROIs, TB & HIV/AIDS policies & resources, and Vulnerable Adult policy. Patient expected to start group on 10/22/2018.      SARANYA Lee  10/11/2018, 11:05 AM

## 2021-06-20 NOTE — PROGRESS NOTES
Rule 25 Assessment  Background Information   1. Date of Assessment Request  2. Date of Assessment  10/02/18 3. Date Service Authorized     4.   SARANYA Barrios 5.  Phone Number [phone] 6. Referent  [facility] Probation 7. Assessment Site  [facility]     8. Client Name Andrey Mccray 9. Date of Birth  1995 Age  23 y.o. 10. Gender  male  11. PMI/ Insurance No.   12. Client's Primary Language:  Yanni 13. Do you require special accommodations, such as an  or assistance with written material? Yes,  Yanni .   14. Current Address: [address] [place] [address]   15. Client Phone Numbers: [phone] (home)    16.  Alternate (cell) Phone Number:       17. Tell me what has happened to bring you here today.     For outpatient treatment that [facility] recommends. [facility] probation for DWI in 10/2017.    18. Have you had other rule 25 assessments? yes, when, where, and what circumstances:  with probabtion about 1 year ago.    DIMENSION I - Acute Intoxication /Withdrawal Potential   1. Chemical use most recent 12 months outside a facility and other significant use history (client self-report)             X = Primary Drug Used   Age of First Use Most Recent Pattern of Use and Duration   Need enough information to show pattern (both frequency and amounts) and to show tolerance for each chemical that has a diagnosis   Date of last use and time, if needed   Withdrawal Potential? Requiring special care Method of use  (oral, smoked, snort, IV, etc)   [x] Alcohol 11 1-2 times per month drinking half a bottle or more  09/18/18 No Oral   [] Marijuana/Hashish 18 A hit or so every day. Stopped once on probation Unknown  Smoke   [] Cocaine/Crack  Denies      [] Meth/Amphetamines  Denies      [] Heroin  Denies      [] Other Opiates/Synthetics  Denies      [] Inhalants  Denies      [] Benzodiazepines  Denies      [] Hallucinogens  Denies      []  "Barbiturates/Sedatives/Hypnotics  Denies      [] Over-the-Counter Drugs  Denies      [] Other  Denies      [] Nicotine ? 1 cigarette per day 10/2/18       2. Do you use greater amounts of alcohol/other drugs to feel intoxicated or achieve the desired effect? yes.  Or use the same amount and get less of an effect? Yes  Example: \"Sometimes I will drink the whole bottle.\"    3A. Have you ever been to detox? no    3B. When was the first time? NA    3C. How many times since then? NA    3D. Date of most recent detox: NA      4.  Withdrawal symptoms: Have you had any of the following withdrawal symptoms?  yes  Past 12 months Recent (past 30 days)   Sad/depressed feeling, Headache, Anxiety/worried Sad/depressed feeling, Headache, Anxiety/worried     Notes:  None    's Visual Observations and Symptoms: No physical signs and/or symptoms of intoxication or withdrawal observed.  Based on the above information, is withdrawal likely to require attention as part of treatment participation?  no    Dimension I Ratings   Acute intoxication/Withdrawal potential - The placing authority must use the criteria in Dimension I to determine a client s acute intoxication and withdrawal potential.    RISK DESCRIPTIONS - Severity ratin  Client displays full functioning with good ability to tolerate and cope with withdrawal discomfort.  No signs or symptoms of intoxication or withdrawal or resolving signs or symptoms    REASONS SEVERITY WAS ASSIGNED (What about the amount of the person s use and date of most recent use and history of withdrawal problems suggests the potential of withdrawal symptoms requiring professional assistance? )    Last use of alcohol on 18. Patient states he is drinking a couple times a month, half a bottle to a full bottle at a time. Patient denies current withdrawal symptoms.     DIMENSION II - Biomedical Complications and Conditions   1. Do you have any current health/medical conditions?(Include any " infectious diseases, allergies, or chronic or acute pain, history of chronic conditions)    [place] but was tested later on and was negative. Does not know which virus he tested positive for.    2. Do you have a health care provider? When was your most recent appointment? What concerns were identified?    [facility]. Has not had an appointment in a long time.    3. If indicated by answers to items 1 or 2: How do you deal with these concerns? Is that working for you? If you are not receiving care for this problem, why not?    NA      4A. List current medication(s) including over-the-counter or herbal supplements--including pain management:    None.    4B. Do you follow current medical recommendations/take medications as prescribed?   yes    4C. When did you last take your medication?  NA    5. Has a health care provider/healer ever recommended that you reduce or quit alcohol/drug use?  No (DSM)    6. Are you pregnant?  NA      6B.  Receiving prenatal care?  NA      6C.  When is your baby due?      7. Have you had any injuries, assaults/violence towards you, accidents, health related issues, overdose(s) or hospitalizations related to your use of alcohol or other drugs:  no    8. Do you have any specific physical needs/accommodations? no    Dimension II Ratings   Biomedical Conditions and Complications - The placing authority must use the criteria in Dimension II to determine a client s biomedical conditions and complications.   RISK DESCRIPTIONS - Severity ratin  Client displays full functioning with good ability to cope with physical discomfort.    REASONS SEVERITY WAS ASSIGNED (What physical/medical problems does this person have that would inhibit his or her ability to participate in treatment? What issues does he or she have that require assistance to address?)    Patient denies any current medical concerns. Has primary care and is able to seek cares as  needed.              DIMENSION III - Emotional, Behavioral, Cognitive Conditions and Complications   1. (Optional) Tell me what it was like growing up in your family. (substance use, mental health, discipline, abuse, support)     Came to [place] in [place]. States growing up was good. Parents are . Has 2 siblings. No substance use issues. Mother might have PTSD. No abuse.    2.  When was the last time that you had significant problems  Past month 2-12 months ago 1+ years ago Never   A. With feeling very trapped, lonely, sad, blue, depressed or hopeless about the future? []  []  [] [x]     B. With sleep trouble, such as bad dreams, sleeping restlessly, or falling asleep during the day? [] [] [] [x]   C. With feeling very anxious, nervous, tense, scared, panicked, or like something bad was going to happen? [] [] [] [x]   D. With becoming very distressed and upset when something reminded you of the past? [] [] [] [x]   E. With thinking about ending your life or committing suicide?  [] [] [x] []     3.  When was the last time that you did the following things two or more times? Past month 2-12 months ago 1+ years ago Never   A. Lied or conned to get things you wanted or to avoid having to do something? [] [] [] [x]   B. Had a hard time paying attention at school, work, or home? [] [] [x] []   C. Had a hard time listening to instructions at school, work, or home?  [] [] [] [x]   D. Were a bully or threatened other people? [] [] [] [x]   E. Started physical fights with other people? [] [] [] [x]     Note: These questions are from the Global Appraisal of Individual Needs--Short Screener. Any item marked  past month  or  2 to 12 months ago  will be scored with a severity rating of at least 2.  For each item that has occurred in the past month or past year ask follow up questions to determine how often the person has felt this way or has the behavior occurred? How recently? How has it affected their daily living? And,  whether they were using or in withdrawal at the time?    2A. NA  2B. NA  2C. NA  2D. NA  2E. NA    3A. NA  3B. Sometimes in school would have a hard time paying attention, mostly because he struggles understanding English.  3C. NA  3D. NA  3E. NA      4A. If the person has answered item 2E with  in the past year  or  the past month , ask about frequency and history of suicide in the family or someone close and whether they were under the influence.    Any history of suicide in your family? Or someone close to you?  no      4B. If the person answered item 2E  in the past month  ask about  intent, plan, means and access and any other follow-up information  to determine imminent risk. Document any actions taken to intervene  on any identified imminent risk.     NA    5A. Have you ever been diagnosed with a mental health problem?  no  5B. Are you receiving care for any mental health issues? no  If yes, what is the focus of that care or treatment?  Are you satisfied with the service?  Most recent appointment?  How has it been helpful?    NA    6A.  Have you been prescribed medications for emotional/psychological problems?  no  6B.  Current mental health medication(s) If these medications are listed for Dimension II, reference item II-5.  6C.  Are you taking your medications as instructed?  NA    7A. Does your MH provider know about your use?  NA  7B.  What does he or she have to say about it? (DSM)  NA    8A. Have you ever been verbally, emotionally, physically or sexually abused? no   Follow up questions to learn current risk, continuing emotional impact.  NA  8B. Have you received counseling for abuse?  no    9A. Have you ever experienced or been part of a group that experienced community violence, historical trauma, rape or assault? no  9B.  How has that affected you?  NA  9C.  Have you received counseling for that?  NA    10A. [place]: no  10B.  Exposure to Combat?  no    11. Do you have problems with any of the  following things in your daily life?  None      Note: If the person has any of the above problems, how do they deal with them, have they developed coping mechanisms?  Have they received treatment?  Follow up with items 12, 13, and 14. If none of the issues in item 11 are a problem for the person, skip to item 15.    NA    12. Have you been diagnosed with traumatic brain injury or Alzheimer s?  no    13.  If the answer to #12 is no, ask the following questions:    Have you ever hit your head or been hit on the head? no    Were you ever seen in the Emergency Room, hospital, or by a doctor because of an injury to your head? no    Have you had any significant illness that affected your brain (brain tumor, meningitis, West Nile Virus, stroke or seizure, heart attack, near drowning or near suffocation)?  no    14.  If the answer to # 12 is yes, ask if any of the problems identified in #11 occurred since the head injury or loss of oxygen NA    15A. Highest grade of school completed:  12th grade, did not graduate.  15B. Do you have a learning disability? no  15C. Did you ever have tutoring in Math or English? no.  15D. Have you ever been diagnosed with Fetal Alcohol Effects or Fetal Alcohol Syndrome? no    Explain:      16. If yes to item 15 B, C, or D: How has this affected your use or been affected by your use?     NA    Dimension III Ratings   Emotional/Behavioral/Cognitive - The placing authority must use the criteria in Dimension III to determine a client s emotional, behavioral, and cognitive conditions and complications.   RISK DESCRIPTIONS - Severity ratin  Client has good impulse control and coping skills and presents no risk of harm to self or others.  Client functions in all life areas and displays no emotional, behavioral. or cognitive problems or the problems are stable.    REASONS SEVERITY WAS ASSIGNED - What current issues might with thinking, feelings or behavior pose barriers to participation in a  "treatment program? What coping skills or other assets does the person have to offset those issues? Are these problems that can be initially accommodated by a treatment provider? If not, what specialized skills or attributes must a provider have?    Patient denies any mental health concerns. No history of trauma or abuse.           DIMENSION IV - Readiness for Change   1. You ve told me what brought you here today. (first section) What do you think the problem really is?     \"I'm not sure. I think it is the treatment that is required\"    2. Tell me how things are going. Ask enough questions to determine whether the person has use related problems or assets that can be built upon in the following areas: Family/friends/relationships; Legal; Financial; Emotional; Educational; Recreational/ leisure; Vocational/employment; Living arrangements (DSM)     \"Things are going pretty good right now.\"    3. What activities have you engaged in when using alcohol/other drugs that could be hazardous to you or others (i.e. driving a car/motorcycle/boat, operating machinery, unsafe sex, sharing needles for drugs or tattoos, etc     None    4. How much time do you spend getting, using or getting over using alcohol or drugs? (DSM)     Not a lot of time.    5. Reasons for drinking/drug use (Use the space below to record answers. It may not be necessary to ask each item.)  Like the feeling    Trying to forget problems    To cope with stress    To relieve physical pain    To cope with anxiety    To cope with depression    To relax or unwind Yes   Makes it easier to talk with people Yes   Partner encourages use    Most friends drink or use Yes   To cope with family problems    Afraid of withdrawal symptoms/to feel better    Other (specify)      A. What concerns other people about your alcohol or drug use/Has anyone told you that you use too much? What did they say? (DSM)    Parents have said they are concerned about drinking. Tell him not " "to drink too much.    B. What did you think about that/ do you think you have a problem with alcohol or drug use?     \"Probably not\"    6. What changes are you willing to make? What substance are you willing to stop using? How are you going to do that? Have you tried that before? What interfered with your success with that goal?     \"Yes, sometimes I think about not drinking anymore.\"    7. What would be helpful to you in making this change?     \"Not sure, maybe if I think of the cause for using.\"    Dimension IV Ratings   Readiness for Change - The placing authority must use the criteria in Dimension IV to determine a client s readiness for change.   RISK DESCRIPTIONS - Severity ratin  Clinet is motivated with active reinforcement, to explore treatment and strategies for change, but ambivalent about illness or need for change.    REASONS SEVERITY WAS ASSIGNED - (What information did the person provide that supports your assessment of his or her readiness to change? How aware is the person of problems caused by continued use? How willing is she or he to make changes? What does the person feel would be helpful? What has the person been able to do without help?)    Patient reports willingness for treatment and to follow recommendations of probation. Patient states he does not think he has a problem with alcohol.         DIMENSION V - Relapse, Continued Use, and Continued Problem Potential   1. In what ways have you tried to control, cut-down or quit your use? If you have had periods of sobriety, how did you accomplish that? What was helpful? What happened to prevent you from continuing your sobriety? (DSM)     None.      2. Have you experienced cravings? If yes, ask follow up questions to determine if the person recognizes triggers and if the person has had any success in dealing with them.     \"Not really.\"    3A. Have you been treated for alcohol/other drug abuse/dependence? no  3B.  Number of times (Lifetime) " (over what period):    3C.  Number of times completed treatment (Lifetime):    3D.  During the past 3 years have you participated in outpatient and/or residential?  no  3E.  When and where?  3F.  What was helpful?  What was not?    4. Support group participation: Have you/do you attend support group meetings to reduce/stop your alcohol/drug use? How recently? What was your experience? Are you willing to restart? If the person has not participated, is he or she willing?     AA meetings, continues to attend    5. What would assist you in staying sober/straight?     Not sure.    Dimension V Ratings   Relapse/Continued Use/Continued problem potential - The placing authority must use the criteria in Dimension V to determine a client s relapse, continued use, and continued problem potential.   RISK DESCRIPTIONS - Severity ratin  (A) Client has minimal recognition and understanding of relapse and recidivism issues and displays moderate vulnerability for further substance use or mental health problems.  (B)  Client has some coping skills inconsistently applied.    REASONS SEVERITY WAS ASSIGNED - (What information did the person provide that indicates his or her understanding of relapse issues? What about the person s experience indicates how prone he or she is to relapse? What coping skills does the person have that decrease relapse potential?)      Patient is high risk for continued use. Patient is unable to identify clear reasons for alcohol use.         DIMENSION VI - Recovery Environment   1. Are you employed/attending school? Tell me about that.     Full-time making car logos.    2A. Describe a typical day; evening for you. Work, school, social, leisure, volunteer, spiritual practices. Include time spent obtaining, using, recovering from drugs or alcohol. (DSM)     Sleeps during the day, works 3rd shift. Doesn't do anything besides work.    2B. How often do you spend more time than you planned using or use more  than you planned? (DSM)     No    3. How important is using to your social connections? Do many of your family or friends use?     Friends drink. Family does not. Drinking is not important to social connections.    4A. Are you currently in a significant relationship?  no  4B.  If yes, how long?    4C. Sexual Orientation:  Heterosexual    5A. Who do you live with? With parents and sisters..  5B. Tell me about their alcohol/drug use and mental health issues. No issues..  5C. Are you concerned for your safety there? no  5D. Are you concerned about the safety of anyone else who lives with you? no    6A. Do you have children who live with you? no  If the person lives with children, ask follow-up questions to determine the person's relationship and responsibility, both legal and care giving; and what arrangements are made for supervision for the children when the person is not available.      6B. Do you have children who do not live with you?  no  If yes, ask follow up questions to learn where the children are, who has custody and what the person't relaltionship and responsibility is with these children and what hopes the person has for his or her future with these children.    7A. Who supports you in making changes in your alcohol or drug use? What are they willing to do to support you? Who is upset or angry about you making changes in your alcohol or drug use? How big a problem is this for you?      My parents, mostly my mom.       7B. This table is provided to record information about the person s relationships and available support It is not necessary to ask each item; only to get a comprehensive picture of their support system.  How often can you count on the following people when you need someone?   Partner / Spouse    Parent(s)/Aunt(s)/Uncle(s)/Grandparents Always supportive   Sibling(s)/Cousin(s)    Child(mikaela)    Other relative(s)    Friend(s)/neighbor(s)    Child(mikaela) s father(s)/mother(s)    Support group  member(s)    Community of wolf members    /counselor/therapist/healer    Other (specify)      8A. What is your current living situation?     Lives with family.     8B. What is your long term plan for where you will be living?    Plans to continue current living environment.    8C. Tell me about your living environment/neighborhood? Ask enough follow up questions to determine safety, criminal activity, availability of alcohol and drugs, supportive or antagonistic to the person making changes.      Safe environment    9. Criminal justice history: Gather current/recent history and any significant history related to substance use--Arrests? Convictions? Circumstances? Alcohol or drug involvement? Sentences? Still on probation or parole? Expectations of the court? Current court order? Any sex offenses - lifetime? What level? (DSM)    DUI 2017, currently on probation with [facility].    10. What obstacles exist to participating in treatment? (Time off work, childcare, funding, transportation, pending detention time, living situation)    No    Dimension VI Ratings   Recovery environment - The placing authority must use the criteria in Dimension VI to determine a client s recovery environment.   RISK DESCRIPTIONS - Severity ratin  Client is engaged in structured, meaningful activity, but peers, family, significant other, living environment are unsupportive, or there is criminal justice involvement by the client or among the client's peers, significatn others, or in the client's environment.    REASONS SEVERITY WAS ASSIGNED - (What support does the person have for making changes? What structure/stability does the person have in his or her daily life that willincrease the likelihood that changes can be sustained? What problems exist in the person s environment that will jeopardize getting/staying clean and sober?)     Patient on probation for DUI. Treatment required by probation. Patient lives in safe  environment with supportive family. Patient works full-time. Patient reports he does not participate in many activities outside of working.               Client Choice/Exceptions     Would you like services specific to language, age, gender, culture, Synagogue preference, race, ethnicity, sexual orientation or disability?  yes    If yes, specify:  Yanni  What particular treatment choices and options would you like to have?  No    Do you have a preference for a particular treatment program?  No        DSM-V Criteria for Substance Abuse  Instructions  Determine whether the client currently meets the criteria for a Substance Use Disorder using the diagnostic criteria in the DSM-V, pp. 481-589. Current means during the most recent 12 months outside a facility that controls access to substances.    Category of substance Severity ICD-10 Code/DSM V Code   [x]  Alcohol Use Disorder [] Mild  [x] Moderate  [] Severe [] (F10.10) (305.00)  [x] (F10.20) (303.90)  [] (F10.20) (303.90)   [x]  Cannabis Use Disorder [x] Mild  [] Moderate  [] Severe [x] (F12.10) (305.20)  [] (F12.20) (304.30)  [] (F12.20) (304.30)   [] Hallucinogen Use Disorder [] Mild  [] Moderate  [] Severe [] (F16.10) (305.30)  [] (F16.20) (304.50)  [] (F16.20) (304.50)   []  Inhalant Use Disorder [] Mild  [] Moderate  [] Severe [] (F18.10) (305.90)  [] (F18.20) (304.60)  [] (F18.20) (304.60)   []  Opioid Use Disorder [] Mild  [] Moderate  [] Severe [] (F11.10) (305.50)  [] (F11.20) (304.00)  [] (F11.20) (304.00)   []  Sedative, Hypnotic, or Anxiolytic Use Disorder [] Mild  [] Moderate  [] Severe [] (F13.10) (305.40)   [] (F13.20) (304.10)  [] (F13.20) (304.10)   []  Stimulant Related Disorders [] Mild  [] Moderate  [] Severe [] (F15.10) (305.70) Amphetamine type substance  [] (F14.10) (305.60) Cocaine  [] (F15.10) (305.70) Other or unspecified stimulant  [] (F15.20) (304.40) Amphetamine type substance  [] (F14.20) (304.20) Cocaine  [] (F15.20) (304.40) Other or  unspecified stimulant  [] (F15.20) (304.40) Amphetamine type substance  [] (F14.20) (304.20) Cocaine  [] (F15.20) (304.40) Other or unspecified stimulant   []  Tobacco use Disorder [] Mild  [] Moderate  [] Severe [] (Z72.0) (305.1)  [] (F17.200) (305.1)  [] (F17.200) (305.1)   []  Other (or unknown) Substance Use Disorder [] Mild  [] Moderate  [] Severe [] (F19.10) (305.90)  [] (F19.20) (304.90)  [] (F19.20) (304.90)       Suggested Level of Care Necessary for Recovery  []  Inpatient  []  Extended Care []  Residential [x]  Outpatient  []  None            Collateral Contact Summary   Number of contacts made:  1  Contact with referring person:  yes     If court related records were reviewed, summarize here:  NA     [x]   Information from collateral contacts supported/largely agreed with information from the client and associated risk ratings.   []   Information from collateral contacts was significantly different from information from the client and lead to different risk ratings.      Summarize new information here:      Rule 25 Assessment Summary and Plan   's Recommendation    Based on the information gathered in this assessment and from collateral information, the client meets DSM-V criteria for Alcohol Use Disorder, Moderate (F10.20) (303.90) and Cannabis Use Disorder, Mild (F12.10) (305.20).    -[facility]  -Abstain from mood altering substances  -Follow recommendations of probation.  -Remain law abiding.    This assessment can be updated with additional information within a 6 month period.      Collateral Contacts     Please duplicate this page for each contact.  If this includes information which is sensitive and not public, separate this page from the rest of the assessment before sharing.  Retain the page in the assessment file.   Name    Todd Martinez [facility]  Phone Number    [phone] Releases    yes       Information Provided:      Todd  left voicemail with information stating patient was sentenced to a DUI, which will  2019. His last UA was positive for THC at 100mg.     Collateral Contacts     Name    EPIC Chart   Relationship    NA Phone Number    NA Releases    NA       Information Provided:      Reviewed previous Rule 25 from 2017.          Staff Name and Title:  [facility]    Date:  10/2/2018  Time:  10:24 AM

## 2021-06-21 NOTE — PROGRESS NOTES
Individual Treatment Plan    Patient  Name: Andrey Mccray  MRN: [id number]   : 1995  Admit Date: 10/11/18  Date of Initial Service Plan: 10/11/18  Tentative Discharge Date: 19    Diagnosis: Alcohol Use Disorder, Moderate (F10.20) (303.90).    Counselor: SARANYA Lee      Dimension 1: Acute Intoxication/Withdrawal Potential, Risk level: 0    Problem Statement from Comprehensive Assessment:  Client reports last date of use as 10/02/18. No signs of intoxication or withdrawal noted or reported.    Problem: Client reports last date of use as 10/02/18. No signs of intoxication or withdrawal noted or reported.  Goal: Patient to maintain abstinence throughout outpatient treatment.   Must be reached to complete treatment? Yes  Methods/Strategies (must include amount and frequency):   1. Patient to report any substance/alcohol use to counselor to determine if any changes need to be made to address withdrawal symptoms.   2. Patient to complete any requested UAs.   Target Date: 19  Completion Date:       Dimension 2: Biomedical Conditions/Complications, Risk level: 0     Problem Statement from Comprehensive Assessment:  The client reports he has no medical needs unmet and is able to obtain any necessary care on his own.   Problem: The client reports he has no medical needs unmet and is able to obtain any necessary care on his own.   Goal: Patient to maintain stable health throughout outpatient treatment.   Must be reached to complete treatment? No  Methods/Strategies (must include amount and frequency):   1. Patient to report any changes to physical health to counselor.   2. Patient to attend all scheduled doctor s appointments.   3. Client will schedule and attend a medical screening with Dr Cotter at [facility] within 30 days of joining group.  Target Date: 18  Completion Date:         Dimension 3: Emotional/Behavioral/Cognitive, Risk level: 0     Problem Statement from Comprehensive  Assessment:  No emotional or mental health problems noted or reported.   Problem: No emotional or mental health problems noted or reported.   Goal: No Plan needed at this time.  Must be reached to complete treatment? N/A  Methods/Strategies (must include amount and frequency):   N/A  Target Date: 01/17/18  Completion Date:       Dimension 4: Readiness to Change, Risk level 1    Problem Statement from Comprehensive Assessment:  Client does not recognize that his alcohol use could be causing problems for him but states he is willing to attend treatment and follow recommendations. The Client appears to lack insight into his substance use problems due in part to cultural barriers that interfere with his understanding of the need for treatment, but did express willingness to attend treatment.    Problem: The Client appears to lack insight into his substance use problems due in part to cultural barriers that interfere with his understanding of the need for treatment, but did express willingness to attend treatment.  Goal: Patient to increase motivation towards recovery by participating in outpatient programming.   Must be reached to complete treatment? Yes  Methods/Strategies (must include amount and frequency):   1. Patient to attend 2, 2-hour groups per week and all scheduled 1:1s.  2. Patient will contact staff if unable to attend.  Target Date: 01/17/18  Completion Date:       Dimension 5: Relapse/Continued Use/Continued Problem Potential, Risk level: 3    Problem Statement from Comprehensive Assessment:  The client did not demonstrate any awareness of situations or emotions that trigger his drinking or display any knowledge or understanding of the skills or coping mechanisms necessary to avoid those triggers. The Client appears to lack insight into his drinking problems due in part to cultural barriers that may interfere with his ability to gain and utilize any new information necessary to establish long term  recovery.    Problem: The Client appears to lack insight into his drinking problems due in part to cultural barriers that may interfere with his ability to gain and utilize any new information necessary to establish long term recovery.  Goal: Client will gain understanding and insight into situations and emotions that trigger his substance use.  Must be reached to complete treatment? Yes  Methods/Strategies (must include amount and frequency):   Patient to share in daily check-in any urges and addictive thinking to better understand his pattern of use and to prevent relapse in the future.   Target Date: 01/17/18  Completion Date:       Dimension 6: Recovery Environment, Risk level: 3    Problem Statement from Comprehensive Assessment:  Client reports he is employed full time and reports he is living with his parents, who are supportive. Client is not currently attending any community based recovery support groups and did not identify any reliable means of recovery support. Client is on probation for his 2nd DWI.    Problem: Client is not currently attending any community based recovery support groups and did not identify any reliable sources of recovery support.  Goal: Client will explore and identify healthy sober supports in his community.  Must be reached to complete treatment? Yes  Methods/Strategies (must include amount and frequency): Client will attend one [facility] Community Support Group per week and report back to counselor and group on his experiences.  Target Date: 01/17/18  Completion Date:       Resources  Resources to which the patient is being referred for problems when problems are to be addressed concurrently by another provider: None.      By signing this document, I am acknowledging that I was actively and directly involved in the development of my treatment plan.     Patient  Signature_________________________________________         Date__________________     Staff Signature  Tripp Perales  [facility]    Date: 10/25/2018, 3:02 PM

## 2021-06-21 NOTE — PROGRESS NOTES
Weekly Progress Note  Andrey Mccray  1995  [id number]      D) Pt attended 3 groups this week with 0 absences. Patient attended 0 individual sessions this week. A) Staff facilitated groups and reviewed tx progress. [facility] for [facility]. R) No VAP needed at this time.   Any significant events, defines as events that impact patients relationship with others inside and outside of treatment No  Indicate any changes or monitoring of physical or mental health problems No    Indicate involvement by any outside supports No  IAPP reviewed and modified as needed. NA  Pt working on the following dimensions:  Dimension #1 - Withdrawal Potential - Risk 0. Client reports last date of use as 10/02/18. No signs of intoxication or withdrawal noted or reported..  Specific goals from treatment plan addressed this week:  Patient to maintain abstinence throughout outpatient treatment.   Effectiveness of strategies:  Progressing: No signs of intoxication or withdrawal noted or reported.     Dimension #2 - Biomedical - Risk 0. The client reports he has no medical needs unmet and is able to obtain any necessary care on his own.   Specific goals from treatment plan addressed this week:  Patient to maintain stable health throughout outpatient treatment.   Effectiveness of strategies:  Progressing: No Changes noted.    Dimension #3 - Emotional/Behavioral/Cognitive - Risk 0. No emotional or mental health problems noted or reported. .  Specific goals from treatment plan addressed this week:  N/A   Effectiveness of strategies:  N/A    Dimension #4 - Treatment Acceptance/Resistance - Risk 1. The Client appears to lack insight into his substance use problems due in part to cultural barriers that interfere with his understanding of the need for treatment, but did express willingness to attend treatment.  Specific goals from treatment plan addressed this week:  Patient to increase motivation towards recovery by participating in outpatient programming.  "  Effectiveness of strategies:  Progressing: Client attended both groups and actively participated without prompting.    Dimension #5 - Relapse Potential - Risk 3. The Client appears to lack insight into his drinking problems due in part to cultural barriers that may interfere with his ability to gain and utilize any new information necessary to establish long term recovery.  Specific goals from treatment plan addressed this week:   The Client appears to lack insight into his drinking problems due in part to cultural barriers that may interfere with his ability to gain and utilize any new information necessary to establish long erm recovery.  Effectiveness of strategies:  Progressing: Client shared his most recent date of use during check in.    Dimension #6 - Recovery Environment - Risk 3. Client is not currently attending any community based recovery support groups and did not identify any reliable sources of recovery support.  Specific goals from treatment plan addressed this week:  Client will explore and identify healthy sober supports in his community.  Effectiveness of strategies:  Progressing: Client was given information regarding the [facility] Community Support Groups and encouraged to attend.    T) Treatment plan updated no.  Patient notified and in agreement NA.  Patient educated on \"Consequences of Alcohol and Drug Use and DWI Presentation\". Patient has completed 11 of 84 program hours at this time. Projected discharge date is 01/17/19. Current discharge plan is not yet developed..     Tripp Perales, [facility]  10/26/2018, 9:08 PM    "

## 2021-06-21 NOTE — PROGRESS NOTES
Andrey Shazia attended 3 hours of group therapy today.    Total group size of 8.    11/20/2018 10:45 PM Tripp Perales

## 2021-06-21 NOTE — PROGRESS NOTES
LATE ENTRY  Weekly Progress Note  Andrey Mccray  1995  [id number]      D) Pt attended 2 groupS this week with 0 absences. Patient attended 0 individual sessions this week. A) Staff facilitated groups and reviewed tx progress. Assessed for [facility]. R) No VAP needed at this time.   Any significant events, defines as events that impact patients relationship with others inside and outside of treatment No  Indicate any changes or monitoring of physical or mental health problems No    Indicate involvement by any outside supports No  IAPP reviewed and modified as needed. NA  Pt working on the following dimensions:  Dimension #1 - Withdrawal Potential - Risk 0. Client reports last date of use as 10/02/18. No signs of intoxication or withdrawal noted or reported..  Specific goals from treatment plan addressed this week:  Patient to maintain abstinence throughout outpatient treatment.   Effectiveness of strategies:  Progressing: No signs of intoxication or withdrawal noted or reported.     Dimension #2 - Biomedical - Risk 0. The client reports he has no medical needs unmet and is able to obtain any necessary care on his own.   Specific goals from treatment plan addressed this week:  Patient to maintain stable health throughout outpatient treatment.   Effectiveness of strategies:  Progressing: No Changes noted.    Dimension #3 - Emotional/Behavioral/Cognitive - Risk 0. No emotional or mental health problems noted or reported. .  Specific goals from treatment plan addressed this week:  N/A   Effectiveness of strategies:  N/A    Dimension #4 - Treatment Acceptance/Resistance - Risk 1. The Client appears to lack insight into his substance use problems due in part to cultural barriers that interfere with his understanding of the need for treatment, but did express willingness to attend treatment.  Specific goals from treatment plan addressed this week:  Patient to increase motivation towards recovery by participating in outpatient  "programming.   Effectiveness of strategies:  Progressing: Client attended both groups this week and participated actively in group without prompting.    Dimension #5 - Relapse Potential - Risk 3. The Client appears to lack insight into his drinking problems due in part to cultural barriers that may interfere with his ability to gain and utilize any new information necessary to establish long term recovery.  Specific goals from treatment plan addressed this week:   The Client appears to lack insight into his drinking problems due in part to cultural barriers that may interfere with his ability to gain and utilize any new information necessary to establish long erm recovery.  Effectiveness of strategies:  Progressing: Client stated during check-in that he had no thoughts or cravings for use this week.    Dimension #6 - Recovery Environment - Risk 3. Client is not currently attending any community based recovery support groups and did not identify any reliable sources of recovery support.  Specific goals from treatment plan addressed this week:  Client will explore and identify healthy sober supports in his community.  Effectiveness of strategies:  Progressing: Client stated he did not attend the [facility] Community Support Group last week due to being at work.    T) Treatment plan updated no.  Patient notified and in agreement NA.  Patient educated on \"Refusal Skills and Review of Section 3\". Patient has completed 20 of 84 program hours at this time. Projected discharge date is 01/17/19. Current discharge plan is not yet developed..     SARANYA Lee  11/19/2018, 3:00 PM    "

## 2021-06-21 NOTE — PROGRESS NOTES
Andrey Mccray attended 3 hours of group therapy today.    Total group size of 8.    11/8/2018 4:08 PM Tripp Perales

## 2021-06-21 NOTE — PROGRESS NOTES
Weekly Progress Note  Andrey Mccray  1995  [id number]      D) Pt attended 1 group this week with 0 absences. Patient attended 0 individual sessions this week. A) Staff facilitated groups and reviewed tx progress. Assessed for [facility]. R) No VAP needed at this time.   Any significant events, defines as events that impact patients relationship with others inside and outside of treatment No  Indicate any changes or monitoring of physical or mental health problems No    Indicate involvement by any outside supports No  IAPP reviewed and modified as needed. NA  Pt working on the following dimensions:  Dimension #1 - Withdrawal Potential - Risk 0. Client reports last date of use as 10/02/18. No signs of intoxication or withdrawal noted or reported..  Specific goals from treatment plan addressed this week:  Patient to maintain abstinence throughout outpatient treatment.   Effectiveness of strategies:  Progressing: No signs of intoxication or withdrawal noted or reported.     Dimension #2 - Biomedical - Risk 0. The client reports he has no medical needs unmet and is able to obtain any necessary care on his own.   Specific goals from treatment plan addressed this week:  Patient to maintain stable health throughout outpatient treatment.   Effectiveness of strategies:  Progressing: No Changes noted.    Dimension #3 - Emotional/Behavioral/Cognitive - Risk 0. No emotional or mental health problems noted or reported. .  Specific goals from treatment plan addressed this week:  N/A   Effectiveness of strategies:  N/A    Dimension #4 - Treatment Acceptance/Resistance - Risk 1. The Client appears to lack insight into his substance use problems due in part to cultural barriers that interfere with his understanding of the need for treatment, but did express willingness to attend treatment.  Specific goals from treatment plan addressed this week:  Patient to increase motivation towards recovery by participating in outpatient programming.  "  Effectiveness of strategies:  Progressing: Client attended both groups this week and participated actively in group without prompting.    Dimension #5 - Relapse Potential - Risk 3. The Client appears to lack insight into his drinking problems due in part to cultural barriers that may interfere with his ability to gain and utilize any new information necessary to establish long term recovery.  Specific goals from treatment plan addressed this week:   The Client appears to lack insight into his drinking problems due in part to cultural barriers that may interfere with his ability to gain and utilize any new information necessary to establish long erm recovery.  Effectiveness of strategies:  Progressing: Client stated during check-in that he had no thoughts or cravings for use this week.    Dimension #6 - Recovery Environment - Risk 3. Client is not currently attending any community based recovery support groups and did not identify any reliable sources of recovery support.  Specific goals from treatment plan addressed this week:  Client will explore and identify healthy sober supports in his community.  Effectiveness of strategies:  Progressing: Client stated he saw his  this week and feels grateful that he is meeting new friends in Recovery.    T) Treatment plan updated no.  Patient notified and in agreement NA.  Patient educated on \"What is Addiction?\". Patient has completed 23 of 84 program hours at this time. Projected discharge date is 01/17/19. Current discharge plan is not yet developed..     SARANYA Lee  11/23/2018, 12:35 PM    "

## 2021-06-21 NOTE — PROGRESS NOTES
Andrey Shazia attended 3 hours of group therapy today.    Total group size of 8.    11/27/2018 4:22 PM Tripp Perales

## 2021-06-21 NOTE — PROGRESS NOTES
Andrey Shazia attended 4 hours of group therapy today.    Total group size of 4.    10/22/2018 4:22 PM Tripp Perales

## 2021-06-21 NOTE — PROGRESS NOTES
Andrey Mccray attended 3 hours of group therapy today.    Total group size of 10.    11/13/2018 3:56 PM Oralia Mustafa

## 2021-06-21 NOTE — PROGRESS NOTES
Andrey Shazia attended 3 hours of group therapy today.    Total group size of 9.    10/23/2018 4:04 PM Tripp Perales

## 2021-06-21 NOTE — PROGRESS NOTES
Weekly Progress Note  Andrey Mccray  1995  [id number]      D) Pt attended 1 group this week with 1 absences. Patient attended 0 individual sessions this week. A) Staff facilitated groups and reviewed tx progress. Assessed for [facility]. R) No VAP needed at this time.   Any significant events, defines as events that impact patients relationship with others inside and outside of treatment No  Indicate any changes or monitoring of physical or mental health problems No    Indicate involvement by any outside supports No  IAPP reviewed and modified as needed. NA  Pt working on the following dimensions:  Dimension #1 - Withdrawal Potential - Risk 0. Client reports last date of use as 10/02/18. No signs of intoxication or withdrawal noted or reported..  Specific goals from treatment plan addressed this week:  Patient to maintain abstinence throughout outpatient treatment.   Effectiveness of strategies:  Progressing: No signs of intoxication or withdrawal noted or reported.     Dimension #2 - Biomedical - Risk 0. The client reports he has no medical needs unmet and is able to obtain any necessary care on his own.   Specific goals from treatment plan addressed this week:  Patient to maintain stable health throughout outpatient treatment.   Effectiveness of strategies:  Progressing: No Changes noted.    Dimension #3 - Emotional/Behavioral/Cognitive - Risk 0. No emotional or mental health problems noted or reported. .  Specific goals from treatment plan addressed this week:  N/A   Effectiveness of strategies:  N/A    Dimension #4 - Treatment Acceptance/Resistance - Risk 1. The Client appears to lack insight into his substance use problems due in part to cultural barriers that interfere with his understanding of the need for treatment, but did express willingness to attend treatment.  Specific goals from treatment plan addressed this week:  Patient to increase motivation towards recovery by participating in outpatient programming.  "  Effectiveness of strategies:  Progressing: Client attended group on 11/08 and reported during check-in that he had missed groups due to not having transportation. Client participated actively in group without prompting.    Dimension #5 - Relapse Potential - Risk 3. The Client appears to lack insight into his drinking problems due in part to cultural barriers that may interfere with his ability to gain and utilize any new information necessary to establish long term recovery.  Specific goals from treatment plan addressed this week:   The Client appears to lack insight into his drinking problems due in part to cultural barriers that may interfere with his ability to gain and utilize any new information necessary to establish long erm recovery.  Effectiveness of strategies:  Progressing: Client shared his most recent date of use during check-in and stated no thoughts or cravings this week.    Dimension #6 - Recovery Environment - Risk 3. Client is not currently attending any community based recovery support groups and did not identify any reliable sources of recovery support.  Specific goals from treatment plan addressed this week:  Client will explore and identify healthy sober supports in his community.  Effectiveness of strategies:  Progressing: Client stated he has not yet attended the [facility] Community Support Groups.    T) Treatment plan updated no.  Patient notified and in agreement NA.  Patient educated on \"What is Recovery? And Building a Recovery Community\". Patient has completed 14 of 84 program hours at this time. Projected discharge date is 01/17/19. Current discharge plan is not yet developed..     SARANYA Lee  11/9/2018, 9:42 AM    "

## 2021-06-21 NOTE — PROGRESS NOTES
Andrey Shazia attended 3 hours of group therapy today.    Total group size of 10.    10/25/2018 4:33 PM Tripp Perales

## 2021-06-21 NOTE — PROGRESS NOTES
Andrey Shazia attended 3 hours of group therapy today.    Total group size of 10.    11/15/2018 4:04 PM Tripp Perales

## 2021-06-22 NOTE — PROGRESS NOTES
Andrey Mccray attended 3 hours of group therapy today.    Total group size of 8.    12/4/2018 4:31 PM Oralia Mustafa

## 2021-06-22 NOTE — PROGRESS NOTES
Andrey Mccray attended 3 hours of group therapy today.    Total group size of 6.    12/27/2018 4:12 PM Oralia Mustafa

## 2021-06-22 NOTE — PROGRESS NOTES
Andrey Shazia attended 3 hours of group therapy today.    Total group size of 5.    12/18/2018 4:17 PM Tripp Perales

## 2021-06-22 NOTE — PROGRESS NOTES
Andrey Shazia attended 3 hours of group therapy today.    Total group size of 8.    12/6/2018 4:35 PM Tripp Perales

## 2021-06-22 NOTE — PROGRESS NOTES
Andrey Shazia attended 3 hours of group therapy today.    Total group size of 7.    12/11/2018 3:31 PM Tripp Perales

## 2021-06-22 NOTE — PROGRESS NOTES
LATE ENTRY  Andrey Mccray attended 3 hours of group therapy today.    Total group size of 6.    1/4/2019 1:05 PM Tripp Perales

## 2021-06-22 NOTE — PROGRESS NOTES
Andrey Shazia attended 3 hours of group therapy today.    Total group size of 9.    11/29/2018 4:07 PM Tripp Perales

## 2021-06-22 NOTE — PROGRESS NOTES
Weekly Progress Note  Andrey Mccray  1995  [id number]      D) Pt attended 2 group this week with 0 absences. Patient attended 0 individual sessions this week. A) Staff facilitated groups and reviewed tx progress. Ass[facility] for [facility]. R) No VAP needed at this time.   Any significant events, defines as events that impact patients relationship with others inside and outside of treatment No  Indicate any changes or monitoring of physical or mental health problems No    Indicate involvement by any outside supports No  IAPP reviewed and modified as needed. NA  Pt working on the following dimensions:  Dimension #1 - Withdrawal Potential - Risk 0. Client reports last date of use of alcohol as 10/02/18. Client reports last date of use of THC as 12/11/18. No signs of intoxication or withdrawal noted or reported..  Specific goals from treatment plan addressed this week:  Patient to maintain abstinence throughout outpatient treatment.   Effectiveness of strategies:  Progressing: Client reported this week that his last date of use of cannabis was 12/11/18.  No signs of intoxication or withdrawal noted or reported.     Dimension #2 - Biomedical - Risk 0. The client reports he has no medical needs unmet and is able to obtain any necessary care on his own.   Specific goals from treatment plan addressed this week:  Patient to maintain stable health throughout outpatient treatment.   Effectiveness of strategies:  Progressing: No Changes noted.    Dimension #3 - Emotional/Behavioral/Cognitive - Risk 0. No emotional or mental health problems noted or reported. .  Specific goals from treatment plan addressed this week:  N/A   Effectiveness of strategies:  N/A    Dimension #4 - Treatment Acceptance/Resistance - Risk 1. The Client appears to lack insight into his substance use problems due in part to cultural barriers that interfere with his understanding of the need for treatment, but did express willingness to attend treatment.  Specific  "goals from treatment plan addressed this week:  Patient to increase motivation towards recovery by participating in outpatient programming.   Effectiveness of strategies:  Progressing: Client attended both groups this week and participated in group with minimal prompting. Client admitted during check-in that he only stopped using cannabis a week ago, and is concerned that his UA results need to show a decrease for him to avoid intermediate.    Dimension #5 - Relapse Potential - Risk 3. The Client appears to lack insight into his drinking problems due in part to cultural barriers that may interfere with his ability to gain and utilize any new information necessary to establish long term recovery.  Specific goals from treatment plan addressed this week:   The Client appears to lack insight into his drinking problems due in part to cultural barriers that may interfere with his ability to gain and utilize any new information necessary to establish long erm recovery.  Effectiveness of strategies:  Progressing: Client stated during check-in that he had no thoughts or cravings for use this week, but also reported he has only stopped using cannabis for 7 days.    Dimension #6 - Recovery Environment - Risk 3. Client is not currently attending any community based recovery support groups and did not identify any reliable sources of recovery support.  Specific goals from treatment plan addressed this week:  Client will explore and identify healthy sober supports in his community.  Effectiveness of strategies:  Progressing: Client stated he did not attend a [facility] Community Support Group meeting this week due to working overtime. Client stated he met with his  this week and was told his UA results need to show a decrease in order to avoid a probation violation.    T) Treatment plan updated no.  Patient notified and in agreement NA.  Patient educated on \"What is Addiction and Understanding Blood Alcohol Content\". " Patient has completed 53 of 110 program hours at this time. Projected discharge date is 01/17/19. Current discharge plan is not yet developed..     Tripp Perales [facility]  12/21/2018, 9:20 AM

## 2021-06-22 NOTE — PROGRESS NOTES
Weekly Progress Note  Andrey Mccray  1995  [id number]      D) Pt attended 1 group this week with 0 absences. Patient attended 0 individual sessions this week. A) Staff facilitated groups and reviewed tx progress. Assessed for [facility]. R) No VAP needed at this time.   Any significant events, defines as events that impact patients relationship with others inside and outside of treatment No  Indicate any changes or monitoring of physical or mental health problems No    Indicate involvement by any outside supports No  IAPP reviewed and modified as needed. NA  Pt working on the following dimensions:  Dimension #1 - Withdrawal Potential - Risk 0. Client reports last date of use of alcohol as 10/02/18. Client reports last date of use of THC as 12/11/18. No signs of intoxication or withdrawal noted or reported..  Specific goals from treatment plan addressed this week:  Patient to maintain abstinence throughout outpatient treatment.   Effectiveness of strategies:  Progressing: Client reported this week that his last date of use of cannabis was 12/11/18.  No signs of intoxication or withdrawal noted or reported.     Dimension #2 - Biomedical - Risk 0. The client reports he has no medical needs unmet and is able to obtain any necessary care on his own.   Specific goals from treatment plan addressed this week:  Patient to maintain stable health throughout outpatient treatment.   Effectiveness of strategies:  Progressing: Patient reports he has not yet made an appointment for a medical screening; he was encouraged to do so.    Dimension #3 - Emotional/Behavioral/Cognitive - Risk 0. No emotional or mental health problems noted or reported. .  Specific goals from treatment plan addressed this week:  N/A   Effectiveness of strategies:  N/A.  Client reports feeling sad and concerned this week about a friend/neighbor who is using methamphetamine a lot, and is having mental health problems because of it.    Dimension #4 - Treatment  Acceptance/Resistance - Risk 1. The Client appears to lack insight into his substance use problems due in part to cultural barriers that interfere with his understanding of the need for treatment, but did express willingness to attend treatment.  Specific goals from treatment plan addressed this week:  Patient to increase motivation towards recovery by participating in outpatient programming.   Effectiveness of strategies:  Progressing: Client attended group this week and participated in group without prompting.     Dimension #5 - Relapse Potential - Risk 3. The Client appears to lack insight into his drinking problems due in part to cultural barriers that may interfere with his ability to gain and utilize any new information necessary to establish long term recovery.  Specific goals from treatment plan addressed this week:  The Client appears to lack insight into his drinking problems due in part to cultural barriers that may interfere with his ability to gain and utilize any new information necessary to establish long erm recovery.  Effectiveness of strategies:  Progressing: Client stated during check-in that he had no cravings for use this week.  Client's last date of use is earlier in December.     Dimension #6 - Recovery Environment - Risk 3. Client is not currently attending any community based recovery support groups and did not identify any reliable sources of recovery support.  Specific goals from treatment plan addressed this week:  Client will explore and identify healthy sober supports in his community.  Effectiveness of strategies:  Progressing: Client stated he did not attend a [facility] Community Support Group meeting last weekend due to work, but that he does plan to attend this weekend since he doesn't have work. Client met with his  last week and was told his UA results need to show a decrease in order to avoid a probation violation.    T) Treatment plan updated no.  Patient  "notified and in agreement NA.  Patient educated on \"What is Addiction/Denial/Enablingt\". Patient has completed 56 of 110 program hours at this time. Projected discharge date is 01/17/19. Current discharge plan is not yet developed..     SARANYA Black  12/27/2018, 5:55 PM  "

## 2021-06-22 NOTE — PROGRESS NOTES
Weekly Progress Note  Andrey Mccray  1995  [id number]      D) Pt attended 2 group this week with 0 absences. Patient attended 0 individual sessions this week. A) Staff facilitated groups and reviewed tx progress. Assessed for [facility]. R) No VAP needed at this time.   Any significant events, defines as events that impact patients relationship with others inside and outside of treatment No  Indicate any changes or monitoring of physical or mental health problems No    Indicate involvement by any outside supports No  IAPP reviewed and modified as needed. NA  Pt working on the following dimensions:  Dimension #1 - Withdrawal Potential - Risk 0. Client reports last date of use of alcohol as 10/02/18. Client reports last date of use of THC as 9/22/18. No signs of intoxication or withdrawal noted or reported..  Specific goals from treatment plan addressed this week:  Patient to maintain abstinence throughout outpatient treatment.   Effectiveness of strategies:  Progressing: Client reported this week that his last date of use of cannabis was 9/22/18.  No signs of intoxication or withdrawal noted or reported.     Dimension #2 - Biomedical - Risk 0. The client reports he has no medical needs unmet and is able to obtain any necessary care on his own.   Specific goals from treatment plan addressed this week:  Patient to maintain stable health throughout outpatient treatment.   Effectiveness of strategies:  Progressing: No Changes noted.    Dimension #3 - Emotional/Behavioral/Cognitive - Risk 0. No emotional or mental health problems noted or reported. .  Specific goals from treatment plan addressed this week:  N/A   Effectiveness of strategies:  N/A    Dimension #4 - Treatment Acceptance/Resistance - Risk 1. The Client appears to lack insight into his substance use problems due in part to cultural barriers that interfere with his understanding of the need for treatment, but did express willingness to attend treatment.  Specific goals  "from treatment plan addressed this week:  Patient to increase motivation towards recovery by participating in outpatient programming.   Effectiveness of strategies:  Progressing: Client attended both groups this week and participated in group with minimal prompting.     Dimension #5 - Relapse Potential - Risk 3. The Client appears to lack insight into his drinking problems due in part to cultural barriers that may interfere with his ability to gain and utilize any new information necessary to establish long term recovery.  Specific goals from treatment plan addressed this week:   The Client appears to lack insight into his drinking problems due in part to cultural barriers that may interfere with his ability to gain and utilize any new information necessary to establish long erm recovery.  Effectiveness of strategies:  Progressing: Client stated during check-in that he had no thoughts or cravings for use this week.    Dimension #6 - Recovery Environment - Risk 3. Client is not currently attending any community based recovery support groups and did not identify any reliable sources of recovery support.  Specific goals from treatment plan addressed this week:  Client will explore and identify healthy sober supports in his community.  Effectiveness of strategies:  Progressing: Client stated he did not attend a [facility] Community Support Group meeting this week due to working overtime. Client stated he met with his  this week and was honest about his last date of use.    T) Treatment plan updated no.  Patient notified and in agreement NA.  Patient educated on \"Anger Management and Domestic Assault\". Patient has completed 41 of 110 program hours at this time. Projected discharge date is 01/17/19. Current discharge plan is not yet developed..     Tripp Perales [facility]  12/14/2018, 12:48 PM    "

## 2021-06-22 NOTE — PROGRESS NOTES
Weekly Progress Note  Andrey Mccray  1995  [id number]      D) Pt attended 2 group this week with 0 absences. Patient attended 0 individual sessions this week. A) Staff facilitated groups and reviewed tx progress. Assessed for [facility]. R) No VAP needed at this time.   Any significant events, defines as events that impact patients relationship with others inside and outside of treatment No  Indicate any changes or monitoring of physical or mental health problems No    Indicate involvement by any outside supports No  IAPP reviewed and modified as needed. NA  Pt working on the following dimensions:  Dimension #1 - Withdrawal Potential - Risk 0. Client reports last date of use of alcohol as 10/02/18. Client reports last date of use of THC as 9/22/18. No signs of intoxication or withdrawal noted or reported..  Specific goals from treatment plan addressed this week:  Patient to maintain abstinence throughout outpatient treatment.   Effectiveness of strategies:  Progressing: Client reported this week that his last date of use of cannabis was 9/22/18.  No signs of intoxication or withdrawal noted or reported.     Dimension #2 - Biomedical - Risk 0. The client reports he has no medical needs unmet and is able to obtain any necessary care on his own.   Specific goals from treatment plan addressed this week:  Patient to maintain stable health throughout outpatient treatment.   Effectiveness of strategies:  Progressing: No Changes noted.    Dimension #3 - Emotional/Behavioral/Cognitive - Risk 0. No emotional or mental health problems noted or reported. .  Specific goals from treatment plan addressed this week:  N/A   Effectiveness of strategies:  N/A    Dimension #4 - Treatment Acceptance/Resistance - Risk 1. The Client appears to lack insight into his substance use problems due in part to cultural barriers that interfere with his understanding of the need for treatment, but did express willingness to attend treatment.  Specific goals  "from treatment plan addressed this week:  Patient to increase motivation towards recovery by participating in outpatient programming.   Effectiveness of strategies:  Progressing: Client attended both groups this week and participated in group with minimal prompting.     Dimension #5 - Relapse Potential - Risk 3. The Client appears to lack insight into his drinking problems due in part to cultural barriers that may interfere with his ability to gain and utilize any new information necessary to establish long term recovery.  Specific goals from treatment plan addressed this week:   The Client appears to lack insight into his drinking problems due in part to cultural barriers that may interfere with his ability to gain and utilize any new information necessary to establish long erm recovery.  Effectiveness of strategies:  Progressing: Client stated during check-in that he had no thoughts or cravings for use this week.    Dimension #6 - Recovery Environment - Risk 3. Client is not currently attending any community based recovery support groups and did not identify any reliable sources of recovery support.  Specific goals from treatment plan addressed this week:  Client will explore and identify healthy sober supports in his community.  Effectiveness of strategies:  Progressing: Client stated he did not attend a [facility] Community Support Group meeting this week due to oversleeping last Saturday.    T) Treatment plan updated no.  Patient notified and in agreement NA.  Patient educated on \"What is Stress, Stress Management\". Patient has completed 35 of 110 program hours at this time. Projected discharge date is 01/17/19. Current discharge plan is not yet developed..     SARANYA Lee  12/7/2018, 11:17 AM    "

## 2021-06-22 NOTE — PROGRESS NOTES
Andrey Shazia attended 3 hours of group therapy today.    Total group size of 7.    12/13/2018 4:23 PM Tripp Perales

## 2021-06-22 NOTE — PROGRESS NOTES
Weekly Progress Note  Andrey Mccray  1995  [id number]      D) Pt attended 1 group this week with 0 absences. Patient attended 0 individual sessions this week. A) Staff facilitated groups and reviewed tx progress. Assessed for [facility]. R) No VAP needed at this time.   Any significant events, defines as events that impact patients relationship with others inside and outside of treatment No  Indicate any changes or monitoring of physical or mental health problems No    Indicate involvement by any outside supports No  IAPP reviewed and modified as needed. NA  Pt working on the following dimensions:  Dimension #1 - Withdrawal Potential - Risk 0. Client reports last date of use as 10/02/18. No signs of intoxication or withdrawal noted or reported..  Specific goals from treatment plan addressed this week:  Patient to maintain abstinence throughout outpatient treatment.   Effectiveness of strategies:  Progressing: No signs of intoxication or withdrawal noted or reported.     Dimension #2 - Biomedical - Risk 0. The client reports he has no medical needs unmet and is able to obtain any necessary care on his own.   Specific goals from treatment plan addressed this week:  Patient to maintain stable health throughout outpatient treatment.   Effectiveness of strategies:  Progressing: No Changes noted.    Dimension #3 - Emotional/Behavioral/Cognitive - Risk 0. No emotional or mental health problems noted or reported. .  Specific goals from treatment plan addressed this week:  N/A   Effectiveness of strategies:  N/A    Dimension #4 - Treatment Acceptance/Resistance - Risk 1. The Client appears to lack insight into his substance use problems due in part to cultural barriers that interfere with his understanding of the need for treatment, but did express willingness to attend treatment.  Specific goals from treatment plan addressed this week:  Patient to increase motivation towards recovery by participating in outpatient programming.  "  Effectiveness of strategies:  Progressing: Client attended both groups this week and participated in group with minimal prompting. Client also stated in group that he feels some of his sadness is turning into happiness, and things are beginning to change for the better.    Dimension #5 - Relapse Potential - Risk 3. The Client appears to lack insight into his drinking problems due in part to cultural barriers that may interfere with his ability to gain and utilize any new information necessary to establish long term recovery.  Specific goals from treatment plan addressed this week:   The Client appears to lack insight into his drinking problems due in part to cultural barriers that may interfere with his ability to gain and utilize any new information necessary to establish long erm recovery.  Effectiveness of strategies:  Progressing: Client stated during check-in that he had no thoughts or cravings for use this week.    Dimension #6 - Recovery Environment - Risk 3. Client is not currently attending any community based recovery support groups and did not identify any reliable sources of recovery support.  Specific goals from treatment plan addressed this week:  Client will explore and identify healthy sober supports in his community.  Effectiveness of strategies:  Progressing: Client stated he did not attend a [facility] Community Support Group meeting this week due to working overtime.  T) Treatment plan updated no.  Patient notified and in agreement NA.  Patient educated on \"What is Addiction?\". Patient has completed 29 of 110 program hours at this time. Projected discharge date is 01/17/19. Current discharge plan is not yet developed..     Tripp Perales, [facility]  11/29/2018, 4:58 PM    "

## 2021-06-22 NOTE — PROGRESS NOTES
Andrey Shazia attended 3 hours of group therapy today.    Total group size of 5.    12/20/2018 4:19 PM Tripp Perales

## 2021-06-23 NOTE — PROGRESS NOTES
Weekly Progress Note  Andrey Mccray  1995  [id number]    D) Pt attended 1 group this week with 0 absences. Patient attended 0 individual sessions this week. A) Staff facilitated groups and reviewed tx progress. [facility] for [facility]. R) No VAP needed at this time.   Any significant events, defines as events that impact patients relationship with others inside and outside of treatment No  Indicate any changes or monitoring of physical or mental health problems No    Indicate involvement by any outside supports No  IAPP reviewed and modified as needed. NA  Pt working on the following dimensions:  Dimension #1 - Withdrawal Potential - Risk 0. Client reports last date of use of alcohol as 10/02/18, and last date of use of cannabis was 12/11/18. No signs of intoxication or withdrawal noted or reported..  Specific goals from treatment plan addressed this week:  Patient to maintain abstinence throughout outpatient treatment.   Effectiveness of strategies:  Progressing: No signs of intoxication or withdrawal noted or reported.     Dimension #2 - Biomedical - Risk 0. The client reports he has no medical needs unmet and is able to obtain any necessary care on his own.   Specific goals from treatment plan addressed this week:  Patient to maintain stable health throughout outpatient treatment.   Effectiveness of strategies:  Progressing: Patient reports he has not yet made an appointment for a medical screening; he was encouraged to do so.    Dimension #3 - Emotional/Behavioral/Cognitive - Risk 0. No emotional or mental health problems noted or reported. .  Specific goals from treatment plan addressed this week:  N/A   Effectiveness of strategies:  N/A.  Client reports feeling sad and concerned this week about a friend/neighbor who is using methamphetamine a lot, and is having mental health problems because of it.    Dimension #4 - Treatment Acceptance/Resistance - Risk 1. The Client appears to lack insight into his substance use  "problems due in part to cultural barriers that interfere with his understanding of the need for treatment, but did express willingness to attend treatment.  Specific goals from treatment plan addressed this week:  Patient to increase motivation towards recovery by participating in outpatient programming.   Effectiveness of strategies:  Progressing: Client attended group this week and participated in discussions without prompting.     Dimension #5 - Relapse Potential - Risk 3. The Client appears to lack insight into his drinking problems due in part to cultural barriers that may interfere with his ability to gain and utilize any new information necessary to establish long term recovery.  Specific goals from treatment plan addressed this week:  The Client will demonstrate awareness and understanding of relapse risks and triggers.  Effectiveness of strategies:  Progressing: Client stated during check-in that he had no use and no thoughts or cravings for use this week.     Dimension #6 - Recovery Environment - Risk 3. Client is not currently attending any community based recovery support groups and did not identify any reliable sources of recovery support.  Specific goals from treatment plan addressed this week:  Client will explore and identify healthy sober supports in his community.  Effectiveness of strategies:  Progressing: Client stated he did not attend the [facility] Community Support Group meeting last weekend due to working.     T) Treatment plan updated no.  Patient notified and in agreement NA.  Patient educated on \"Rebuilding Healthy Families .  Patient has completed 64 of 110 program hours at this time. Projected Discharge date is 04/12/19. Current discharge plan is not yet developed..     Tripp Perales [facility]  2/1/2019, 8:43 AM  "

## 2021-06-23 NOTE — PROGRESS NOTES
Andrey Shazia attended 3 hours of group therapy today.    Total group size of 6.    1/10/2019 4:43 PM Tripp Perales

## 2021-06-23 NOTE — PROGRESS NOTES
Andrey Mccray attended 3 hours of group therapy today.    Total group size of 8.    1/15/2019 4:03 PM Tripp Perales

## 2021-06-23 NOTE — PROGRESS NOTES
Weekly Progress Note  Andrey Mccray  1995  [id number]      D) Pt attended 1 group this week with 1 absences. Patient attended 0 individual sessions this week. A) Staff facilitated groups and reviewed tx progress. Assessed for [facility]. R) No VAP needed at this time.   Any significant events, defines as events that impact patients relationship with others inside and outside of treatment No  Indicate any changes or monitoring of physical or mental health problems No    Indicate involvement by any outside supports No  IAPP reviewed and modified as needed. NA  Pt working on the following dimensions:  Dimension #1 - Withdrawal Potential - Risk 0. Client reports last date of use of alcohol as 10/02/18. Client reports last date of use of THC as 12/11/18. No signs of intoxication or withdrawal noted or reported..  Specific goals from treatment plan addressed this week:  Patient to maintain abstinence throughout outpatient treatment.   Effectiveness of strategies:  Progressing: Client reported this week that his last date of use of cannabis was 12/11/18.  No signs of intoxication or withdrawal noted or reported.     Dimension #2 - Biomedical - Risk 0. The client reports he has no medical needs unmet and is able to obtain any necessary care on his own.   Specific goals from treatment plan addressed this week:  Patient to maintain stable health throughout outpatient treatment.   Effectiveness of strategies:  Progressing: Patient reports he has not yet made an appointment for a medical screening; he was encouraged to do so.    Dimension #3 - Emotional/Behavioral/Cognitive - Risk 0. No emotional or mental health problems noted or reported. .  Specific goals from treatment plan addressed this week:  N/A   Effectiveness of strategies:  N/A.  Client reports feeling sad and concerned this week about a friend/neighbor who is using methamphetamine a lot, and is having mental health problems because of it.    Dimension #4 - Treatment  Acceptance/Resistance - Risk 1. The Client appears to lack insight into his substance use problems due in part to cultural barriers that interfere with his understanding of the need for treatment, but did express willingness to attend treatment.  Specific goals from treatment plan addressed this week:  Patient to increase motivation towards recovery by participating in outpatient programming.   Effectiveness of strategies:  Progressing: Client attended group this week and participated in group without prompting. Collateral Sources provided information this week to suggest that the client's report of his last date of use is not accurate.    Dimension #5 - Relapse Potential - Risk 3. The Client appears to lack insight into his drinking problems due in part to cultural barriers that may interfere with his ability to gain and utilize any new information necessary to establish long term recovery.  Specific goals from treatment plan addressed this week:  The Client appears to lack insight into his drinking problems due in part to cultural barriers that may interfere with his ability to gain and utilize any new information necessary to establish long erm recovery.  Effectiveness of strategies:  Progressing: Client stated during check-in that he had no thoughts or cravings for use this week.     Dimension #6 - Recovery Environment - Risk 3. Client is not currently attending any community based recovery support groups and did not identify any reliable sources of recovery support.  Specific goals from treatment plan addressed this week:  Client will explore and identify healthy sober supports in his community.  Effectiveness of strategies:  Progressing: Client stated he did not attend a [facility] Community Support Group meeting last weekend but he plans to attend this weekend.     T) Treatment plan updated no.  Patient notified and in agreement NA.  Patient educated on  Reviewing Life Experiences  and  How Do I Want My  Life to Be Different? .  Patient has completed 56 of 110 program hours at this time. Projected Transfer date is 01/17/19. Current discharge plan is not yet developed..     Tripp Peralse, [facility]  1/11/2019, 3:43 PM

## 2021-06-23 NOTE — PROGRESS NOTES
Weekly Progress Note  Andrey Mccray  1995  [id number]    D) Pt attended 1 group this week with 1 absences. Patient attended 0 individual sessions this week. A) Staff facilitated groups and reviewed tx progress. Assessed for [facility]. R) No VAP needed at this time.   Any significant events, defines as events that impact patients relationship with others inside and outside of treatment No  Indicate any changes or monitoring of physical or mental health problems No    Indicate involvement by any outside supports No  IAPP reviewed and modified as needed. NA  Pt working on the following dimensions:  Dimension #1 - Withdrawal Potential - Risk 0. Client reports last date of use of alcohol as 10/02/18, and last date of use of cannabis was 12/11/18. No signs of intoxication or withdrawal noted or reported..  Specific goals from treatment plan addressed this week:  Patient to maintain abstinence throughout outpatient treatment.   Effectiveness of strategies:  Progressing: No signs of intoxication or withdrawal noted or reported.     Dimension #2 - Biomedical - Risk 0. The client reports he has no medical needs unmet and is able to obtain any necessary care on his own.   Specific goals from treatment plan addressed this week:  Patient to maintain stable health throughout outpatient treatment.   Effectiveness of strategies:  Progressing: Patient reports he has not yet made an appointment for a medical screening; he was encouraged to do so.    Dimension #3 - Emotional/Behavioral/Cognitive - Risk 0. No emotional or mental health problems noted or reported. .  Specific goals from treatment plan addressed this week:  N/A   Effectiveness of strategies:  N/A.  Client reports feeling sad and concerned this week about a friend/neighbor who is using methamphetamine a lot, and is having mental health problems because of it.    Dimension #4 - Treatment Acceptance/Resistance - Risk 1. The Client appears to lack insight into his substance use  problems due in part to cultural barriers that interfere with his understanding of the need for treatment, but did express willingness to attend treatment.  Specific goals from treatment plan addressed this week:  Patient to increase motivation towards recovery by participating in outpatient programming.   Effectiveness of strategies:  Progressing: Client attended both groups this week and participated in discussions without prompting.     Dimension #5 - Relapse Potential - Risk 3. The Client appears to lack insight into his drinking problems due in part to cultural barriers that may interfere with his ability to gain and utilize any new information necessary to establish long term recovery.  Specific goals from treatment plan addressed this week:  The Client will demonstrate awareness and understanding of relapse risks and triggers.  Effectiveness of strategies:  Progressing: Client stated during check-in that he had no use and no thoughts or cravings for use this week.     Dimension #6 - Recovery Environment - Risk 3. Client is not currently attending any community based recovery support groups and did not identify any reliable sources of recovery support.  Specific goals from treatment plan addressed this week:  Client will explore and identify healthy sober supports in his community.  Effectiveness of strategies:  Progressing: Client stated he did not attend a [facility] Community Support Group meeting last weekend due to not having transportation. Client was reminded that transortation is provided to and from the Saturday group and given information regarding that resource.     T) Treatment plan updated no.  Patient notified and in agreement NA.  Patient educated on  Doctor's Presentation and DWI Laws and Penalties .  Patient has completed 65 of 110 program hours at this time. Client has completed Phase One of IOP and is being transferred to Recovery Maintenance Group. Projected Discharge date is 04/12/19.  Current discharge plan is not yet developed..     Tripp Perales, [facility] [place]  1/18/2019, 11:12 AM

## 2021-06-23 NOTE — PROGRESS NOTES
Andrey Shazia attended 2 hours of group therapy today.    Total group size of 6.    1/30/2019 3:15 PM Tripp Perales

## 2021-06-23 NOTE — PROGRESS NOTES
Andrey Shazia attended 3 hours of group therapy today.    Total group size of 9.    1/17/2019 4:02 PM Tripp Perales

## 2021-06-24 NOTE — PROGRESS NOTES
Andrey Shazia attended 2 hours of group therapy today.    Total group size of 6.    3/13/2019 3:20 PM Tripp Perales

## 2021-06-24 NOTE — PROGRESS NOTES
Weekly Progress Note  Andrey Mccray  1995  [id number]    D) Pt attended 1 group this week with 0 absences. Patient attended 0 individual sessions this week. A) Staff facilitated groups and reviewed tx progress. Assessed for [facility]. R) No VAP needed at this time.   Any significant events, defines as events that impact patients relationship with others inside and outside of treatment No  Indicate any changes or monitoring of physical or mental health problems No    Indicate involvement by any outside supports No  IAPP reviewed and modified as needed. NA  Pt working on the following dimensions:  Dimension #1 - Withdrawal Potential - Risk 0. Client reports last date of use of alcohol as 10/02/18, and last date of use of cannabis was 12/11/18. No signs of intoxication or withdrawal noted or reported..  Specific goals from treatment plan addressed this week:  Patient to maintain abstinence throughout outpatient treatment.   Effectiveness of strategies:  Progressing: No signs of intoxication or withdrawal noted or reported.     Dimension #2 - Biomedical - Risk 0. The client reports he has no medical needs unmet and is able to obtain any necessary care on his own.   Specific goals from treatment plan addressed this week:  Patient to maintain stable health throughout outpatient treatment.   Effectiveness of strategies:  Progressing: Patient reports he has not yet made an appointment for a medical screening; he was encouraged to do so.    Dimension #3 - Emotional/Behavioral/Cognitive - Risk 0. No emotional or mental health problems noted or reported. .  Specific goals from treatment plan addressed this week:  N/A   Effectiveness of strategies:  N/A.      Dimension #4 - Treatment Acceptance/Resistance - Risk 1. The Client appears to lack insight into his substance use problems due in part to cultural barriers that interfere with his understanding of the need for treatment, but did express willingness to attend treatment.  Specific  "goals from treatment plan addressed this week:  Patient to increase motivation towards recovery by participating in outpatient programming.   Effectiveness of strategies:  Progressing: Client attended group this week and participated in discussions without prompting.     Dimension #5 - Relapse Potential - Risk 3. The Client appears to lack insight into his drinking problems due in part to cultural barriers that may interfere with his ability to gain and utilize any new information necessary to establish long term recovery.  Specific goals from treatment plan addressed this week:  The Client will demonstrate awareness and understanding of relapse risks and triggers.  Effectiveness of strategies:  Progressing: Client stated during check-in that he had no use and no thoughts or cravings for use this week.     Dimension #6 - Recovery Environment - Risk 3. Client is not currently attending any community based recovery support groups and did not identify any reliable sources of recovery support.  Specific goals from treatment plan addressed this week:  Client will explore and identify healthy sober supports in his community.  Effectiveness of strategies:  Progressing: Client stated he did not attend the [facility] Community Support Group meeting last weekend because he fell asleep and forgot about group. Client also reported that he met with his  this week.    T) Treatment plan updated no.  Patient notified and in agreement NA.  Patient educated on \"Helping Others in a Recovery Community .  Patient has completed 68 of 110 program hours at this time. Projected Discharge date is 04/12/19. Current discharge plan is not yet developed..     SARANYA Lee  2/20/2019, 4:56 PM  "

## 2021-06-24 NOTE — PROGRESS NOTES
Weekly Progress Note  Anrdey Mccray  1995  [id number]    D) Pt attended 1 group this week with 0 absences. Patient attended 0 individual sessions this week. A) Staff facilitated groups and reviewed tx progress. Assessed for [facility]. R) No VAP needed at this time.   Any significant events, defines as events that impact patients relationship with others inside and outside of treatment No  Indicate any changes or monitoring of physical or mental health problems No    Indicate involvement by any outside supports No  IAPP reviewed and modified as needed. NA  Pt working on the following dimensions:  Dimension #1 - Withdrawal Potential - Risk 0. Client reports last date of use of alcohol as 10/02/18, and last date of use of cannabis was 12/11/18. No signs of intoxication or withdrawal noted or reported..  Specific goals from treatment plan addressed this week:  Patient to maintain abstinence throughout outpatient treatment.   Effectiveness of strategies:  Progressing: No signs of intoxication or withdrawal noted or reported.     Dimension #2 - Biomedical - Risk 0. The client reports he has no medical needs unmet and is able to obtain any necessary care on his own.   Specific goals from treatment plan addressed this week:  Patient to maintain stable health throughout outpatient treatment.   Effectiveness of strategies:  Progressing: Patient reports he has not yet made an appointment for a medical screening; he was encouraged to do so.    Dimension #3 - Emotional/Behavioral/Cognitive - Risk 0. No emotional or mental health problems noted or reported. .  Specific goals from treatment plan addressed this week:  N/A   Effectiveness of strategies:  N/A.      Dimension #4 - Treatment Acceptance/Resistance - Risk 1. The Client appears to lack insight into his substance use problems due in part to cultural barriers that interfere with his understanding of the need for treatment, but did express willingness to attend treatment.  Specific  "goals from treatment plan addressed this week:  Patient to increase motivation towards recovery by participating in outpatient programming.   Effectiveness of strategies:  Progressing: Client attended group this week and participated in discussions without prompting. Client stataed during check-in that he saw a friend having hallucinations related to alcohol use and was frightened to see how bad things could get.    Dimension #5 - Relapse Potential - Risk 3. The Client appears to lack insight into his drinking problems due in part to cultural barriers that may interfere with his ability to gain and utilize any new information necessary to establish long term recovery.  Specific goals from treatment plan addressed this week:  The Client will demonstrate awareness and understanding of relapse risks and triggers.  Effectiveness of strategies:  Progressing: Client stated during check-in that he had no use and no thoughts or cravings for use this week.     Dimension #6 - Recovery Environment - Risk 3. Client is not currently attending any community based recovery support groups and did not identify any reliable sources of recovery support.  Specific goals from treatment plan addressed this week:  Client will explore and identify healthy sober supports in his community.  Effectiveness of strategies:  Progressing: Client stated he did not attend the [facility] Community Support Group this week due to the meeting being cancelled.      T) Treatment plan updated no.  Patient notified and in agreement NA.  Patient educated on \"How and When to Help Others .  Patient has completed 70 of 110 program hours at this time. Projected Discharge date is 04/12/19. Current discharge plan is not yet developed..     Tripp Perales, [facility]  2/28/2019, 9:02 AM  "

## 2021-06-24 NOTE — PROGRESS NOTES
Late Entry  Andrey Mccray attended 2 hours of group therapy today.    Total group size of 7.    2/28/2019 8:32 AM Tripp Perales

## 2021-06-24 NOTE — PROGRESS NOTES
Weekly Progress Note  Andrey Mccray  1995  [id number]    D) Pt attended 1 group this week with 0 absences. Patient attended 0 individual sessions this week. A) Staff facilitated groups and reviewed tx progress. Assessed for [facility]. R) No VAP needed at this time.   Any significant events, defines as events that impact patients relationship with others inside and outside of treatment No  Indicate any changes or monitoring of physical or mental health problems No    Indicate involvement by any outside supports No  IAPP reviewed and modified as needed. NA  Pt working on the following dimensions:  Dimension #1 - Withdrawal Potential - Risk 0. Client reports last date of use of alcohol as 10/02/18, and last date of use of cannabis was 12/11/18. No signs of intoxication or withdrawal noted or reported..  Specific goals from treatment plan addressed this week:  Patient to maintain abstinence throughout outpatient treatment.   Effectiveness of strategies:  Progressing: No signs of intoxication or withdrawal noted or reported.     Dimension #2 - Biomedical - Risk 0. The client reports he has no medical needs unmet and is able to obtain any necessary care on his own.   Specific goals from treatment plan addressed this week:  Patient to maintain stable health throughout outpatient treatment.   Effectiveness of strategies:  Progressing: Patient reports he has not yet made an appointment for a medical screening; he was encouraged to do so.    Dimension #3 - Emotional/Behavioral/Cognitive - Risk 0. No emotional or mental health problems noted or reported. .  Specific goals from treatment plan addressed this week:  N/A   Effectiveness of strategies:  N/A.      Dimension #4 - Treatment Acceptance/Resistance - Risk 1. The Client appears to lack insight into his substance use problems due in part to cultural barriers that interfere with his understanding of the need for treatment, but did express willingness to attend treatment.  Specific  "goals from treatment plan addressed this week:  Patient to increase motivation towards recovery by participating in outpatient programming.   Effectiveness of strategies:  Progressing: Client attended group this week and participated in discussions without prompting. Client was asked to provide a UA sample after group.    Dimension #5 - Relapse Potential - Risk 3. The Client appears to lack insight into his drinking problems due in part to cultural barriers that may interfere with his ability to gain and utilize any new information necessary to establish long term recovery.  Specific goals from treatment plan addressed this week:  The Client will demonstrate awareness and understanding of relapse risks and triggers.  Effectiveness of strategies:  Progressing: Client stated during check-in that he had no use and no thoughts or cravings for use this week.     Dimension #6 - Recovery Environment - Risk 3. Client is not currently attending any community based recovery support groups and did not identify any reliable sources of recovery support.  Specific goals from treatment plan addressed this week:  Client will explore and identify healthy sober supports in his community.  Effectiveness of strategies:  Progressing: Client stated he did not attend the [facility] Community Support Group this week but would try to remember next week. Client was reminded of the expectation of the program.     T) Treatment plan updated no.  Patient notified and in agreement NA.  Patient educated on \"Taking Responsibility .  Patient has completed 72 of 110 program hours at this time. Projected Discharge date is 04/12/19. Current discharge plan is not yet developed..     SARANYA Lee  3/7/2019, 4:57 PM  "

## 2021-06-24 NOTE — PROGRESS NOTES
Andrey Shazia attended 2 hours of group therapy today.    Total group size of 7.    2/13/2019 3:24 PM Tripp Perales

## 2021-06-24 NOTE — PROGRESS NOTES
Andrey Shazia attended 2 hours of group therapy today.    Total group size of 4.    2/20/2019 4:33 PM Tripp Perales

## 2021-06-24 NOTE — PROGRESS NOTES
Andrey Shazia attended 2 hours of group therapy today.    Total group size of 7.    3/6/2019 3:08 PM Tripp Perales

## 2021-06-24 NOTE — PROGRESS NOTES
Weekly Progress Note  Andrey Mccray  1995  [id number]  D) Client attended 0 groups this week with 1 absence. Client did not call to report the reason for missing group, but it is likely his absence was due to inclement weather. Client attended 0 individual sessions this week. A) Unable to assess progress due to lack of attendance.   T) Treatment plan updated: No.  Client notified and in agreement: NA.  Client has completed 69 of 110 program hours at this time. Projected discharge date is 4/10/19. Current discharge plan is not yet developed.   SARANYA Lee  2/21/2019, 9:15 AM

## 2021-06-25 NOTE — PROGRESS NOTES
Weekly Progress Note  Andrey Mccray  1995  [id number]    D) Pt attended 1 group this week with 0 absences. Patient attended 0 individual sessions this week. A) Staff facilitated groups and reviewed tx progress. Assessed for [facility]. R) No VAP needed at this time.   Any significant events, defines as events that impact patients relationship with others inside and outside of treatment No  Indicate any changes or monitoring of physical or mental health problems No    Indicate involvement by any outside supports No  IAPP reviewed and modified as needed. NA  Pt working on the following dimensions:  Dimension #1 - Withdrawal Potential - Risk 0. Client reports last date of use of alcohol as 10/02/18, and last date of use of cannabis was 12/11/18. No signs of intoxication or withdrawal noted or reported..  Specific goals from treatment plan addressed this week:  Patient to maintain abstinence throughout outpatient treatment.   Effectiveness of strategies:  Progressing: No signs of intoxication or withdrawal noted or reported.     Dimension #2 - Biomedical - Risk 0. The client reports he has no medical needs unmet and is able to obtain any necessary care on his own.   Specific goals from treatment plan addressed this week:  Patient to maintain stable health throughout outpatient treatment.   Effectiveness of strategies:  Progressing: Patient was reminded to make an appointment for a medical screening at his earliest opportunity.    Dimension #3 - Emotional/Behavioral/Cognitive - Risk 0. No emotional or mental health problems noted or reported. .  Specific goals from treatment plan addressed this week:  N/A   Effectiveness of strategies:  N/A.      Dimension #4 - Treatment Acceptance/Resistance - Risk 1. The Client appears to lack insight into his substance use problems due in part to cultural barriers that interfere with his understanding of the need for treatment, but did express willingness to attend treatment.  Specific goals  "from treatment plan addressed this week:  Patient to increase motivation towards recovery by participating in outpatient programming.   Effectiveness of strategies:  Progressing: Client attended group this week and participated in discussions without prompting. Lab results from client's previous UA were positive for THC, but the sample was reportedly dilute. Client will be asked to provide another sample before next group.    Dimension #5 - Relapse Potential - Risk 3. The Client appears to lack insight into his drinking problems due in part to cultural barriers that may interfere with his ability to gain and utilize any new information necessary to establish long term recovery.  Specific goals from treatment plan addressed this week:  The Client will demonstrate awareness and understanding of relapse risks and triggers.  Effectiveness of strategies:  Progressing: Client stated during check-in that he had no use and no thoughts or cravings for use this week.     Dimension #6 - Recovery Environment - Risk 3. Client is not currently attending any community based recovery support groups and did not identify any reliable sources of recovery support.  Specific goals from treatment plan addressed this week:  Client will explore and identify healthy sober supports in his community.  Effectiveness of strategies:  Progressing: Client stated he did not attend the [facility] Community Support Group this week but would try to remember this weekend. Client reported he received his EHM ankle bracelet this week and stated he doesn't like having to provide breath samples so frequently, and has been losing sleep because of it.    T) Treatment plan updated no.  Patient notified and in agreement NA.  Patient educated on \"Recovery for the Long Term .  Patient has completed 74 of 110 program hours at this time. Projected Discharge date is 04/12/19. Current discharge plan is not yet developed..     Tripp Perales, [facility]  3/15/2019, 10:42 " AM

## 2021-07-04 NOTE — PROGRESS NOTES
Rule 31 by Tripp Perales LADC at 10/11/2018 10:00 AM     Author: Tripp Perales LADC Service: Addiction Care Author Type: Licensed Alcohol and Drug Counselor    Filed: 10/11/2018 12:57 PM Encounter Date: 10/11/2018 Status: Signed    : Tripp Perales LADC (Licensed Alcohol and Drug Counselor)         [facility] Assessment   Date: 10/11/2018        : SARANYA Lee    Name: Andrey Mccray  Address:  [address] [place] [address]  Phone: [phone] (home)   Referral Source: PROBATION/K.O.M.  : 1995  Age: 23 y.o.  Race/Ethnicity:   Marital Status: SINGLE  Employment: FULL TIME                                                                                                                        Level of Education: 8th Grade in [employer], 12th grade here but didn't graduate.   Socio-economic (yearly Income) Status: 31Kj  Sexual Orientation: HETERO  Last 4 digits of Social Security: 1599    Is assistance required in the ability to read and understand written material?   yes -     Reason for seeking services:    Got second DWI, probation required to do assessment and follow recommendations.    Dimension I Acute intoxication/Withdrawal Potential:    Symptomology (past 12 months, check all that apply)  increased tolerance, weekly intoxication and repeated family or social problems    Observed or reported (withdrawal symptoms, check all that apply)  sweating (rapid pulse), nausea/vomiting, headache and sad/depressed feeling    Chemical use most recent 12 months outside a facility and other significant use history (client self-report)  Primary Drug Used  Age of First Use  Most Recent Pattern of Use and Duration    Date of last use  Time if substance use in the last 30 days Withdrawal Potential? Requiring special care  Method of use   (oral, smoked, snort, IV, etc)    Alcohol  11 1 or 2x per month, up to a whole bottle 10/02/18  No Oral   Marijuana/Hashish  18 A hit or two  almost every day. Stopped once I got on probation. unk  No Smoke   Cocaine/Crack          Meth/Amphetamines          Heroin          Other Opiates/Synthetics          Inhalants          Benzodiazepines          Hallucinogens          Barbiturates/Sedatives/Hypnotics          Over-the-Counter Drugs          Other          Nicotine              Dimension I Risk Ratin  Reason Risk Rating Assigned: No signs of intoxication or withdrawal noted or reported.        Dimension II Biomedical Conditions:    Any known health conditions: No    Ever previously treated/diagnosed with any eating disorder?  no     List Health Concerns/Conditions Reported: NONE    Does patient indicate awareness of any association between substance use and listed health concerns/conditions? No    Physical/Health Conditions which are associated with substance use: NONE    Are Health Concerns/Conditions being treated? Yes  By Whom? No Primary Care Provider     Patient Self-Reported Medications:  Medication Dosage Frequency   NONE                                                              Are you pregnant: NA OB care received:NA CPS call needed: NA    Dimension II Risk Ratin  Reason Risk Rating Assigned: The client reports he has no medical needs unmet and is able to obtain any necessary care on his own.         Dimension III Emotional/Behavioral/Cognitive:    Oriented to person, place, time, situation?  Yes     Current Mental Health Services: no    Past Hospitalization for MH or psychiatric problems: No    How many Hospitalizations: 0   Last Hospitalization; date and location: ---      Past or Current Issues with Gambling (Explain): no    Prior Treatment for Gambling: No     MH Diagnoses:  NONE  Psychiatrist: NONE     Clinic: NONE      Current Psychotropic Medications:  NONE    Taking medications as prescribed:  No   Medications Helpful: NA    Current Suicidal Ideation: No  If yes, any plan? NA What is plan?:   ---    Previous Suicide  Attempts?  No   Explain: ---     Current Homicidal Ideation: No  If yes, any plan? NA  What is plan?: ---    Previous Homicide Attempts? No Explain: ---    Suicidal/Homicidal Ideation in last 30 days? No  Explain: ---     Hazardous behavior engaged in which placed self or others in danger (i.e., operating a motor vehicle, unsafe sex, sharing needles, etc.)? DRIVING    Family history of substance and/or mental health diagnosis/issues?  No  Explain: ---     History of abuse (Physical, Emotional, Sexual)? No  Explain: ---       Dimension III Risk Ratin  Reason Risk Rating Assigned: No emotional or mental health problems noted or reported.        Dimension IV Readiness to Change:    Mandated, or coerced into assessment or treatment:  Yes    Does client feel there is a problem:   No    Verbalization of need/desire to change:   YES    Willing to follow treatment recommendations: Yes     Impression of : (Check all that apply):    cooperative, genuinely motivated, ambivalent about change, minimal awareness and low motivation    Are there any spiritual, cultural, or other special needs to be addressed for client to be successful in treatment? no        Dimension IV Risk Ratin  Reason Risk Rating Assigned: Client does not recognize that his alcohol use could be causing problems for him but states he is willing to attend treatment and follow recommendations. The Client appears to lack insight into his substance use problems due in part to cultural barriers that interfere with his understanding of the need for treatment, but did express willingness to attend treatment.        Dimension V Relapse/Continued Use/Continued Problem Potential     Client age at First Treatment: ---    Lifetime # of CD Treatments:  0  List program, dates, and status of completion (within last five years): ---    Longest Period of Abstinence: 2 WEEKS  How did you accomplish this? Just didn't drink.     Circumstances which led to Relapse:  ---    Risk Taking/Problem Behaviors Related to Use and/or Under the Influence: Denies.      Dimension V Risk Rating: 3  Reason Risk Rating Assigned: The client did not demonstrate any awareness of situations or emotions that trigger his drinking or display any knowledge or understanding of the skills or coping mechanisms necessary to avoid those triggers. The Client appears to lack insight into his drinking problems due in part to cultural barriers that may interfere with his ability to gain and utilize any new information necessary to establish long term recovery.        Dimension VI Recovery Environment   Family support:  Yes  Peer Sober Support:  Yes    Current living circumstances:  Lives with parents.    Environment supportive of recovery:  Yes    Specific activities participating in which do not involve substance use: Play soccer, maybe go fishing.    Specific activities participating in which do involve substance use: Hanging out with friends, visiting.    People, things that threaten recovery: no    Expected family involvement during treatment services:  None    Current Legal Involvement:  Probation in [facility] (2 years) for DWI    Legal Consequences related to use: 2ND DWI. ONE OPEN BOTTLE CHARGE IN 2010.    Occupational/Academic consequences related to use: None    Current ability to function in a work and/or education setting: No Impairments noted or reported.    Current support network for recovery (including community-based recovery support): NONE    Do you belong to a [place]: No Which [place]? NONE  Reside on reservation: No     Dimension VI Risk Rating: 3 Reason Risk Rating Assigned: Client reports he is employed full time and reports he is living with his parents, who are supportive. Client is not currently attending any community based recovery support groups and did not identify any reliable means of recovery support. Client is on probation for his 2nd DWI.          DSM-V Criteria for Substance  Abuse  Instructions:  Determine whether the client currently meets the criteria for a Substance Use Disorder using the diagnostic criteria in the  DSM-V, pp. 481-584. Current means during the most recent 12 months outside a facility that controls access to substances.    Category of substance Severity ICD-10 Code/DSM V Code  Alcohol Use Disorder Mild  Moderate  Severe (F10.10) (305.00)  (F10.20) (303.90)  (F10.20) (303.90)   Cannabis Use Disorder Mild  Moderate  Severe (F12.10) (305.20)  (F12.20) (304.30)  (F12.20) (304.30)   Hallucinogen Use Disorder Mild  Moderate  Severe (F16.10) (305.30)  (F16.20) (304.50)  (F16.20) (304.50)   Inhalant Use Disorder Mild  Moderate  Severe (F18.10) (305.90)  (F18.20) (304.60)  (F18.20) (304.60)   Opioid Use Disorder Mild  Moderate  Severe (F11.10) (305.50)  (F11.20) (304.00)  (F11.20) (304.00)   Sedative, Hypnotic, or Anxiolytic Use Disorder Mild  Moderate  Severe (F13.10) (305.40)  (F13.20) (304.10)  (F13.20) (304.10)   Stimulant Related Disorders Mild              Moderate              Severe   (F15.10) (305.70) Amphetamine type substance  (F14.10) (305.60) Cocaine  (F15.10) (305.70) Other or unspecified stimulant    (F15.20) (304.40) Amphetamine type substance  (F14.20) (304.20) Cocaine  (F15.20) (304.40) Other or unspecified stimulant    (F15.20) (304.40) Amphetamine type substance  (F14.20) (304.20) Cocaine  (F15.20) (304.40) Other or unspecified stimulant   DisorderTobacco use Disorder Mild  Moderate  Severe (Z72.0) (305.1)  (F17.200) (305.1)  (F17.200) (305.1)   Other (or unknown) Substance Use Disorder Mild  Moderate  Severe (F19.10) (305.90)  (F19.20) (304.90)  (F19.20) (304.90)     Diagnostic Impression: Alcohol Use Disorder, Moderate (F10.20) (303.90)    Assessment Completed Within 3 Sessions of Admission: Yes  If NO, date assessment to be completed noted in Treatment Plan: No      Signature of Counselor: SARANYA Lee  Date and Time of Signature: 10/11/2018, 12:57  PM

## 2021-08-03 PROBLEM — F10.20 ALCOHOL USE DISORDER, MODERATE, DEPENDENCE (H): Status: RESOLVED | Noted: 2019-03-06 | Resolved: 2019-04-05

## 2021-08-11 ENCOUNTER — IMMUNIZATION (OUTPATIENT)
Dept: FAMILY MEDICINE | Facility: CLINIC | Age: 26
End: 2021-08-11
Payer: COMMERCIAL

## 2021-08-11 PROCEDURE — 0011A PR COVID VAC MODERNA 100 MCG/0.5 ML IM: CPT

## 2021-08-11 PROCEDURE — 91301 PR COVID VAC MODERNA 100 MCG/0.5 ML IM: CPT

## 2021-09-08 ENCOUNTER — IMMUNIZATION (OUTPATIENT)
Dept: FAMILY MEDICINE | Facility: CLINIC | Age: 26
End: 2021-09-08
Attending: FAMILY MEDICINE
Payer: COMMERCIAL

## 2021-09-08 PROCEDURE — 0012A PR COVID VAC MODERNA 100 MCG/0.5 ML IM: CPT

## 2021-09-08 PROCEDURE — 91301 PR COVID VAC MODERNA 100 MCG/0.5 ML IM: CPT
